# Patient Record
Sex: FEMALE | Race: WHITE | NOT HISPANIC OR LATINO | Employment: FULL TIME | URBAN - METROPOLITAN AREA
[De-identification: names, ages, dates, MRNs, and addresses within clinical notes are randomized per-mention and may not be internally consistent; named-entity substitution may affect disease eponyms.]

---

## 2017-03-06 ENCOUNTER — ALLSCRIPTS OFFICE VISIT (OUTPATIENT)
Dept: OTHER | Facility: OTHER | Age: 32
End: 2017-03-06

## 2017-03-08 LAB
HPV 18 (HISTORICAL): NOT DETECTED
HPV HIGH RISK 16/18 (HISTORICAL): NOT DETECTED
HPV16 (HISTORICAL): NOT DETECTED
PAP (HISTORICAL): NORMAL

## 2017-12-24 LAB — EXTERNAL RUBELLA IGG QUANTITATION: NORMAL
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
## 2018-01-11 NOTE — MISCELLANEOUS
Message   Date: 09 Jun 2016 3:26 PM EST, Recorded By: Britney Hoffmann For: Micheal Gallegos   Caller: Levon Felty, Other   Reason: Other   Patient is enrolled in [address] [place] [address] with [facility]  Any services that require authorization she can contacted to help getting it approved  Phone number [phone]  Ext [phone]  AP        Active Problems    1  Encounter for supervision of normal first pregnancy in third trimester (V22 0) (Z34 03)   2  Group beta Strep positive (041 02) (B95 1)   3  Maternal obesity, antepartum, second trimester (649 13,278 00) (O99 212,E66 9)   4  Obesity in pregnancy (649 10) (O99 210)   5  Placental condition affecting management of mother in second trimester (656 73)   (O43 92)    Current Meds   1  Prenatal Vitamins CAPS; Therapy: (Recorded:11Nov2015) to Recorded    Allergies    1  Ceclor CAPS    2  No Known Environmental Allergies   3  No Known Food Allergies   4  Other    Signatures   Electronically signed by :  Forrest Cruz, ; Jun 9 2016  3:29PM EST                       (Author)

## 2018-01-13 VITALS
HEIGHT: 65 IN | DIASTOLIC BLOOD PRESSURE: 72 MMHG | WEIGHT: 199 LBS | SYSTOLIC BLOOD PRESSURE: 122 MMHG | BODY MASS INDEX: 33.15 KG/M2

## 2018-01-15 NOTE — PROGRESS NOTES
MAR 4 2016         RE: Stephanie Mcguire                                To: [facility]  MR#: [id number]                                    [address]   : [address]   ENC: [id number] Cerrato, [address]   (Exam #: [id number])                           Fax: [phone]      The LMP of this 28year old,  1, para 0 patient was AUG 21 2015,   giving her an BHAVANI of MAY 27 2016 and a current gestational age of 35 weeks   0 days by dates  A sonographic examination was performed on MAR 4 2016   using real time equipment  The ultrasound examination was performed using   abdominal technique  The patient has a BMI of 33 6  Her blood pressure   today was 119/68  Earliest ultrasound found in her record: 11/11/15  11w1d  BHAVANI 16      Stephanie has no complaints today  She reports regular fetal movements and   denies problems related to hypertension, gestational diabetes,    labor, or vaginal bleeding  Cardiac motion was observed at 146 bpm       INDICATIONS      fetal growth   obesity   abnormal uterine Doppler      Exam Types      Level I      RESULTS      Fetus # 1 of 1   Vertex presentation   Fetal growth appeared normal   Placenta Location = Anterior   Placenta Grade = I      MEASUREMENTS (* Included In Average GA)      AC              23 9 cm        28 weeks 1 day * (48%)   BPD              7 0 cm        28 weeks 0 days* (39%)   HC              25 6 cm        27 weeks 4 days* (28%)   Femur            5 5 cm        28 weeks 6 days* (54%)      Cerebellum       3 2 cm        28 weeks 4 days      HC/AC           1 07   FL/AC           0 23   FL/BPD          0 78   EFW (Ac/Fl/Hc)  1210 grams - 2 lbs 11 oz                 (52%)      THE AVERAGE GESTATIONAL AGE is 28 weeks 1 day +/- 14 days        AMNIOTIC FLUID      Q1: 3 8      Q2: 3 8      Q3: 3 6      Q4: 3 9   YOVANI Total = 15 1 cm   Amniotic Fluid: Normal      IMPRESSION      Arnett IUP   28 weeks and 1 day by this ultrasound  (BHAVANI=MAY 26 2016)   Vertex presentation   Fetal growth appeared normal   Regular fetal heart rate of 146 bpm   Anterior placenta      GENERAL COMMENT      No fetal structural abnormality is identified on the Level I survey today  The fetal brain, heart including the four-chamber, septal, and 3 vessel   tracheal views, stomach, kidneys, bladder, three vessel cord, and   diaphragm appear normal   Fetal interval growth and amniotic fluid volume   are normal       Today's ultrasound findings and suggested follow-up were discussed in   detail with Stephanie  She will return to the [facility]  in the mid   third trimester to assess interval growth  Daily third trimester fetal   kick counting was discussed at the visit today  The face to face time, in addition to time spent discussing ultrasound   results, was 10 minutes, greater than 50% of which was spent during   counseling and coordination of care  Amelie Ehrich, R D M S Percell Closs, M D     Maternal-Fetal Medicine   Electronically signed 03/04/16 13:44

## 2018-01-16 NOTE — MISCELLANEOUS
Message  Pt called this am with c/o of pain in folds of groin  Pt states she thinks she pulled a muscle while moving in bed the other night  Pt is concerned with continued pain in this area  States it feels like muscular pulling  Pt made aware that round ligament pain can be very common especially in first pregnancy  Pt to use warm, moist heat, tylenol  Denies abd pain, or bleeding  States she feels baby move  Active Problems    1  Encounter for fetal anatomic survey (V28 81) (Z36)   2  Encounter for prenatal care in first trimester of first pregnancy (V22 0) (Z34 01)   3  Encounter for prenatal care of first pregnancy, unspecified trimester (V22 0) (Z34 00)   4  Encounter for screening for risk of pre-term labor (V28 82) (Z36)   5  Maternal obesity, antepartum, second trimester (649 13,278 00) (O99 212,E66 9)   6  Obesity in pregnancy (649 10) (O99 210)   7  Placental condition affecting management of mother in second trimester (656 73)   (O43 92)   8  Pregnancy, obstetrical care (V22 1) (Z34 90)    Current Meds   1  Prenatal Vitamins CAPS; Therapy: (Recorded:11Nov2015) to Recorded    Allergies    1  Ceclor CAPS    2  No Known Environmental Allergies   3  No Known Food Allergies   4   Other    Signatures   Electronically signed by : Zana Prabhakar, ; Jan 29 2016 10:59AM EST                       (Author)

## 2018-01-18 NOTE — PROGRESS NOTES
APR 15 2016         RE: Stephanie Mcguire                                To: [facility]  MR#: [id number]                                    [address]   : [address]   ENC: [id number]                             Saqib, [address]   (Exam #: [id number])                           Fax: [phone]      The LMP of this 28year old,  1, para 0 patient was AUG 21 2015,   giving her an BHAVANI of MAY 27 2016 and a current gestational age of 29 weeks   0 days by dates  A sonographic examination was performed on APR 15 2016   using real time equipment  The patient has a BMI of 35 4  Her blood   pressure today was 108/62  Earliest ultrasound found in her record: 11/11/15  11w1d  BHAAVNI 16      Cardiac motion was observed at 144 bpm       INDICATIONS      obesity   abnormal uterine Doppler   fetal anatomical survey      Exam Types      LEVEL II   Transvaginal      RESULTS      Fetus # 1 of 1   Vertex presentation   Placenta Location = Posterior   Placenta Grade = II      MEASUREMENTS (* Included In Average GA)      AC              30 9 cm        35 weeks 0 days* (65%)   BPD              8 5 cm        34 weeks 3 days* (53%)   HC              30 6 cm        33 weeks 4 days* (29%)   Femur            6 8 cm        34 weeks 4 days* (65%)      HC/AC           0 99   FL/AC           0 22   FL/BPD          0 80   EFW (Ac/Fl/Hc)  2489 grams - 5 lbs 8 oz                 (54%)      THE AVERAGE GESTATIONAL AGE is 34 weeks 3 days +/- 21 days        AMNIOTIC FLUID      Q1: 3 2      Q2: 3 0      Q3: 4 8      Q4: 2 0   YOVANI Total = 13 0 cm   Amniotic Fluid: Normal      ANATOMY      Head                                    Normal   Heart                                   See Details   Stomach                                 Normal   Right Kidney                            Normal   Left Kidney                             Normal   Bladder Normal   Placenta                                Normal      ANATOMY DETAILS      Visualized Appearing Sonographically Normal:   HEAD: (Calvarium, BPD Level, Lateral Ventricles, Choroid Plexus,   Cerebellum, Cisterna Magna); HEART: (Cardiac Axis, Cardiac Position);      STOMACH, RIGHT KIDNEY, LEFT KIDNEY, BLADDER, PLACENTA      Not Visualized:   HEART: (Four Chamber View, Proximal Left Outflow, Proximal Right Outflow,   Interventricular Septum, Interatrial Septum)      ANATOMY COMMENTS      Fetal anatomy has been previously documented; no anomalies were   identified  No fetal structural abnormality is identified on the Level I   survey today  Follow up anatomy of the lateral ventricles, diaphragm,    kidneys, stomach and bladder appear normal  Fetal interval growth and   amniotic fluid volume are normal       IMPRESSION      Arnett IUP   34 weeks and 3 days by this ultrasound  (BHAVANI=MAY 24 2016)   Vertex presentation   Regular fetal heart rate of 144 bpm   Posterior placenta      GENERAL COMMENT         I had the pleasure of seeing Evelyn Sapp  in the [facility]  today   for followup growth scan  She reports normal daily fetal movements  She   denies any vaginal bleeding, leakage of fluid, or significant contractions   or pelvic pressure  There has been no major pregnancy complications since   her last visit here in the  Center  Kimber does have a slightly   elevated body mass index of 35  Her prior ultrasound had showed an   abnormal uterine artery Doppler flow study  On today's ultrasound, the fetus was in a vertex presentation  The   amniotic fluid appeared very normal today  Fetal growth was within normal   range  Growth was at the 54th percentile today which is reassuring  The   interval anatomy seen today showed no obvious anomalies  We discussed the importance of initiating fetal kick counting at least   once daily   We discussed the "10 kicks in 2 hour rule"  I instructed her   to report to you immediately should criteria not be met  Kick counts   should begin at 28 weeks gestation  IMPORTANT  FINDINGS ON TODAY'S ULTRASOUND: None, overall very reassuring   ultrasound today in the  Center         IN SUMMARY:  Today's ultrasound was reassuring as the fetus is growing   well with normal amniotic fluid and a normal umbilical artery Doppler flow   study  The fetus was in a vertex presentation  She should continue to do   her kick counts on a daily basis  No further growth scans appear indicated   at this time  Certainly we would be happy to see her if there were any   pregnancy complications  Face-to-face time, in addition to time spent discussing ultrasound results   was 10 minutes, with greater than 50% of the time used for counseling and   coordination of care  A description of the counseling/coordination of care   is described above  Thank you very much for allowing us to participate in the care of your   patient  If you have any questions or concerns about today's visit please   do not hesitate to call me  Thank you very much  Demetris SUAREZ  Maternal Fetal Medicine      ARCELIA Brown M D     Maternal-Fetal Medicine   Electronically signed 04/15/16 15:17

## 2018-06-26 ENCOUNTER — OFFICE VISIT (OUTPATIENT)
Dept: OBGYN CLINIC | Age: 33
End: 2018-06-26
Payer: COMMERCIAL

## 2018-06-26 VITALS
SYSTOLIC BLOOD PRESSURE: 100 MMHG | DIASTOLIC BLOOD PRESSURE: 64 MMHG | BODY MASS INDEX: 34.55 KG/M2 | WEIGHT: 207.38 LBS | HEIGHT: 65 IN

## 2018-06-26 DIAGNOSIS — Z01.419 ENCOUNTER FOR GYNECOLOGICAL EXAMINATION WITHOUT ABNORMAL FINDING: Primary | ICD-10-CM

## 2018-06-26 PROCEDURE — S0612 ANNUAL GYNECOLOGICAL EXAMINA: HCPCS | Performed by: OBSTETRICS & GYNECOLOGY

## 2018-06-26 NOTE — ASSESSMENT & PLAN NOTE
Pap/HPV current due 2020  Contraception: condoms    Encourage healthy diet, exercise, Calcium 1200mg per day and at least 800 iu Vitamin D daily  If planning on conception in the next year- recommend starting prenatal vitamin
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
## 2018-06-26 NOTE — PATIENT INSTRUCTIONS
Wellness Visit for Adults   WHAT YOU NEED TO KNOW:   What is a wellness visit? A wellness visit is when you see your healthcare provider to get screened for health problems  You can also get advice on how to stay healthy  Write down your questions so you remember to ask them  Ask your healthcare provider how often you should have a wellness visit  What happens at a wellness visit? Your healthcare provider will ask about your health, and your family history of health problems  This includes high blood pressure, heart disease, and cancer  He or she will ask if you have symptoms that concern you, if you smoke, and about your mood  You may also be asked about your intake of medicines, supplements, food, and alcohol  Any of the following may be done:  · Your weight  will be checked  Your height may also be checked so your body mass index (BMI) can be calculated  Your BMI shows if you are at a healthy weight  · Your blood pressure  and heart rate will be checked  Your temperature may also be checked  · Blood and urine tests  may be done  Blood tests may be done to check your cholesterol levels  Abnormal cholesterol levels increase your risk for heart disease and stroke  You may also need a blood or urine test to check for diabetes if you are at increased risk  Urine tests may be done to look for signs of an infection or kidney disease  · A physical exam  includes checking your heartbeat and lungs with a stethoscope  Your healthcare provider may also check your skin to look for sun damage  · Screening tests  may be recommended  A screening test is done to check for diseases that may not cause symptoms  The screening tests you may need depend on your age, gender, family history, and lifestyle habits  For example, colorectal screening may be recommended if you are 48years old or older  What screening tests do I need if I am a woman? · A Pap smear  is used to screen for cervical cancer   Pap smears are usually done every 3 to 5 years depending on your age  You may need them more often if you have had abnormal Pap smear test results in the past  Ask your healthcare provider how often you should have a Pap smear  · A mammogram  is an x-ray of your breasts to screen for breast cancer  Experts recommend mammograms every 2 years starting at age 48 years  You may need a mammogram at age 52 years or younger if you have an increased risk for breast cancer  Talk to your healthcare provider about when you should start having mammograms and how often you need them  What vaccines might I need? · Get an influenza vaccine  every year  The influenza vaccine protects you from the flu  Several types of viruses cause the flu  The viruses change over time, so new vaccines are made each year  · Get a tetanus-diphtheria (Td) booster vaccine  every 10 years  This vaccine protects you against tetanus and diphtheria  Tetanus is a severe infection that may cause painful muscle spasms and lockjaw  Diphtheria is a severe bacterial infection that causes a thick covering in the back of your mouth and throat  · Get a human papillomavirus (HPV) vaccine  if you are female and aged 23 to 32 or male 23 to 24 and never received it  This vaccine protects you from HPV infection  HPV is the most common infection spread by sexual contact  HPV may also cause vaginal, penile, and anal cancers  · Get a pneumococcal vaccine  if you are aged 72 years or older  The pneumococcal vaccine is an injection given to protect you from pneumococcal disease  Pneumococcal disease is an infection caused by pneumococcal bacteria  The infection may cause pneumonia, meningitis, or an ear infection  · Get a shingles vaccine  if you are aged 61 or older, even if you have had shingles before  The shingles vaccine is an injection to protect you from the varicella-zoster virus  This is the same virus that causes chickenpox   Shingles is a painful rash that develops in people who had chickenpox or have been exposed to the virus  How can I eat healthy? My Plate is a model for planning healthy meals  It shows the types and amounts of foods that should go on your plate  Fruits and vegetables make up about half of your plate, and grains and protein make up the other half  A serving of dairy is included on the side of your plate  The amount of calories and serving sizes you need depends on your age, gender, weight, and height  Examples of healthy foods are listed below:  · Eat a variety of vegetables  such as dark green, red, and orange vegetables  You can also include canned vegetables low in sodium (salt) and frozen vegetables without added butter or sauces  · Eat a variety of fresh fruits , canned fruit in 100% juice, frozen fruit, and dried fruit  · Include whole grains  At least half of the grains you eat should be whole grains  Examples include whole-wheat bread, wheat pasta, brown rice, and whole-grain cereals such as oatmeal     · Eat a variety of protein foods such as seafood (fish and shellfish), lean meat, and poultry without skin (turkey and chicken)  Examples of lean meats include pork leg, shoulder, or tenderloin, and beef round, sirloin, tenderloin, and extra lean ground beef  Other protein foods include eggs and egg substitutes, beans, peas, soy products, nuts, and seeds  · Choose low-fat dairy products such as skim or 1% milk or low-fat yogurt, cheese, and cottage cheese  · Limit unhealthy fats  such as butter, hard margarine, and shortening  How much exercise do I need? Exercise at least 30 minutes per day on most days of the week  Some examples of exercise include walking, biking, dancing, and swimming  You can also fit in more physical activity by taking the stairs instead of the elevator or parking farther away from stores  Include muscle strengthening activities 2 days each week  Regular exercise provides many health benefits  It helps you manage your weight, and decreases your risk for type 2 diabetes, heart disease, stroke, and high blood pressure  Exercise can also help improve your mood  Ask your healthcare provider about the best exercise plan for you  What are some general health and safety guidelines I should follow? · Do not smoke  Nicotine and other chemicals in cigarettes and cigars can cause lung damage  Ask your healthcare provider for information if you currently smoke and need help to quit  E-cigarettes or smokeless tobacco still contain nicotine  Talk to your healthcare provider before you use these products  · Limit alcohol  A drink of alcohol is 12 ounces of beer, 5 ounces of wine, or 1½ ounces of liquor  · Lose weight, if needed  Being overweight increases your risk of certain health conditions  These include heart disease, high blood pressure, type 2 diabetes, and certain types of cancer  · Protect your skin  Do not sunbathe or use tanning beds  Use sunscreen with a SPF 15 or higher  Apply sunscreen at least 15 minutes before you go outside  Reapply sunscreen every 2 hours  Wear protective clothing, hats, and sunglasses when you are outside  · Drive safely  Always wear your seatbelt  Make sure everyone in your car wears a seatbelt  A seatbelt can save your life if you are in an accident  Do not use your cell phone when you are driving  This could distract you and cause an accident  Pull over if you need to make a call or send a text message  · Practice safe sex  Use latex condoms if are sexually active and have more than one partner  Your healthcare provider may recommend screening tests for sexually transmitted infections (STIs)  · Wear helmets, lifejackets, and protective gear  Always wear a helmet when you ride a bike or motorcycle, go skiing, or play sports that could cause a head injury  Wear protective equipment when you play sports   Wear a lifejacket when you are on a boat or doing water sports  CARE AGREEMENT:   You have the right to help plan your care  Learn about your health condition and how it may be treated  Discuss treatment options with your caregivers to decide what care you want to receive  You always have the right to refuse treatment  The above information is an  only  It is not intended as medical advice for individual conditions or treatments  Talk to your doctor, nurse or pharmacist before following any medical regimen to see if it is safe and effective for you  © 2017 [employer] Information is for End User's use only and may not be sold, redistributed or otherwise used for commercial purposes  All illustrations and images included in CareNotes® are the copyrighted property of A D A M [employer] Inc  or [employer]

## 2018-10-31 ENCOUNTER — OFFICE VISIT (OUTPATIENT)
Dept: OBGYN CLINIC | Facility: CLINIC | Age: 33
End: 2018-10-31
Payer: COMMERCIAL

## 2018-10-31 VITALS — SYSTOLIC BLOOD PRESSURE: 110 MMHG | DIASTOLIC BLOOD PRESSURE: 56 MMHG | BODY MASS INDEX: 33.38 KG/M2 | WEIGHT: 200.6 LBS

## 2018-10-31 DIAGNOSIS — N91.2 AMENORRHEA: Primary | ICD-10-CM

## 2018-10-31 PROBLEM — Z01.419 ENCOUNTER FOR GYNECOLOGICAL EXAMINATION WITHOUT ABNORMAL FINDING: Status: RESOLVED | Noted: 2018-06-26 | Resolved: 2018-10-31

## 2018-10-31 PROCEDURE — 76801 OB US < 14 WKS SINGLE FETUS: CPT | Performed by: PHYSICIAN ASSISTANT

## 2018-10-31 PROCEDURE — 99213 OFFICE O/P EST LOW 20 MIN: CPT | Performed by: PHYSICIAN ASSISTANT

## 2018-10-31 RX ORDER — PNV 119/IRON FUM/FOLIC ACID 29 MG-1 MG
1 TABLET ORAL DAILY
COMMUNITY

## 2018-10-31 RX ORDER — MAGNESIUM 30 MG
250 TABLET ORAL DAILY
COMMUNITY
End: 2019-05-31 | Stop reason: ALTCHOICE

## 2018-10-31 NOTE — ASSESSMENT & PLAN NOTE
(+) viable IUP, size consistent with dates  BHAVANI 6/3/19 by LMP  Pt congratulated and questions  RTO for interview and then PN1

## 2018-10-31 NOTE — PROGRESS NOTES
Early OB Ultrasound Procedure Note  Stephanie Mcguire    HPI  HPI    Referring Physician: Marsha Castanon MD     Technician: Study performed by the interpreting physician assistant    Indications:  amenorrhea   /56   Wt 91 kg (200 lb 9 6 oz)   LMP 2018   BMI 33 38 kg/m²     Patient's last menstrual period was 2018  with EGA of  9 weeks and 2 days        Procedure Details  A gestational sac is seen containing a yolk sac and a marcano embryo  The embryonic crown-rump length measures 2 24 cm  and calculates to an estimated gestational age of 10 weeks and 0   Days    Embryonic cardiac activity is present @ 176 BPM  Description of fetal anatomy Normal  Description of ovaries: normal  Description of uterus: normal    Findings:  Viable, marcano intrauterine pregnancy at 9 weeks,  2 days, with a calculated EDC of Mary 3, 2019 by LMP

## 2018-11-09 ENCOUNTER — TELEPHONE (OUTPATIENT)
Dept: OBGYN CLINIC | Facility: MEDICAL CENTER | Age: 33
End: 2018-11-09

## 2018-11-09 ENCOUNTER — TELEPHONE (OUTPATIENT)
Dept: OBGYN CLINIC | Facility: CLINIC | Age: 33
End: 2018-11-09

## 2018-11-09 NOTE — TELEPHONE ENCOUNTER
Patient left voicemail - she is about 10 weeks pregnant  - wants to know if she can have a TB test done  Please call her back

## 2018-11-09 NOTE — TELEPHONE ENCOUNTER
Pt is 10 weeks and needs a TB test for a job, she was wondering if she could have that done while pregnant? Please advise

## 2018-11-09 NOTE — TELEPHONE ENCOUNTER
Spoke with Pt today via phone call  Pt informed that it is safe to receive a TB test during pregnancy per RN Win Gao  Reiterated to Pt that if she has any questions/concerns to contact office

## 2018-11-15 ENCOUNTER — TELEPHONE (OUTPATIENT)
Dept: OBGYN CLINIC | Facility: CLINIC | Age: 33
End: 2018-11-15

## 2018-11-15 NOTE — TELEPHONE ENCOUNTER
Called pt to do phone interview earlier than her scheduled time d/t office closing early; LM for pt to call back to reschedule

## 2018-11-21 ENCOUNTER — TELEPHONE (OUTPATIENT)
Dept: OBGYN CLINIC | Age: 33
End: 2018-11-21

## 2018-12-04 ENCOUNTER — TELEPHONE (OUTPATIENT)
Dept: OBGYN CLINIC | Facility: CLINIC | Age: 33
End: 2018-12-04

## 2018-12-04 DIAGNOSIS — Z34.81 PRENATAL CARE, SUBSEQUENT PREGNANCY, FIRST TRIMESTER: Primary | ICD-10-CM

## 2018-12-04 NOTE — TELEPHONE ENCOUNTER
Patient would like to have her 20 week gender reveal ultrasound but the [facility] office told the patient that she needs a referral from out office to have it done

## 2018-12-06 ENCOUNTER — INITIAL PRENATAL (OUTPATIENT)
Dept: OBGYN CLINIC | Facility: CLINIC | Age: 33
End: 2018-12-06

## 2018-12-06 VITALS — BODY MASS INDEX: 33.38 KG/M2 | HEIGHT: 65 IN

## 2018-12-06 DIAGNOSIS — Z34.82 PRENATAL CARE, SUBSEQUENT PREGNANCY, SECOND TRIMESTER: Primary | ICD-10-CM

## 2018-12-06 PROCEDURE — OBC: Performed by: PHYSICIAN ASSISTANT

## 2018-12-06 NOTE — PROGRESS NOTES
OB INTAKE INTERVIEW  * Pt presents for OB intake  * Accompanied by: phone intake   *  *Hx of  delivery prior to 36 weeks 6 days NO  *Last Menstrual Period: Pt's LMP was 18  *Ultrasound date:10/31/18   9weeks 0days  *Estimated date of delivery: Mary 3, 2019   * confirmed by LMP    *Signs/Symptoms of Pregnancy   *nausea   *constipation NO   *headaches NO   *cramping/spotting NO   *PICA cravings NO  *Diabetes- if you answer yes, please order 1 hour GTT, 50g   *hx of GDM NO   *BMI >35 NO   *first degree relative with type 2 diabetes YES   *hx of PCOS NO   *current metformin use NO   *prior hx of LGA/macrosomia NO   *AMA with other risk factors NO  *Hypertension- if you answer yes, please order preeclampsia labs including 24 hour urine protein   *Hx of chronic HTN NO   *hx of gestational HTN NO   *hx of preeclampsia, eclampsia, or HELLP syndrome NO  *Immunizations:   *influenza vaccine given today NO-phone intake, needs at PN1   *discussed Tdap vaccine    *Interview education   *ANTERIOS Pregnancy Essentials Book reviewed and discussed   *Handouts given:    *Baby and Me phone emily guide    *Baby and Me support center    *[email or web address]     *discussed genetic testing- pt interested NO     *appointment at [place] [id number] made YES-for level II    *Prenatal lab work scripts mailed  *I have these concerns about this prenatal patient: none  *Details that I feel the provider should be aware of: Stephanie delivered her first child Almon Cassette with Dr Francheska Daily, she is familiar with our practice  She will be going to [facility] for her labs  She works as a teacher  PN1 visit scheduled  The patient was oriented to our practice and all questions were answered      Interviewed by: Lory Kinney RN

## 2018-12-18 ENCOUNTER — INITIAL PRENATAL (OUTPATIENT)
Dept: OBGYN CLINIC | Facility: MEDICAL CENTER | Age: 33
End: 2018-12-18
Payer: COMMERCIAL

## 2018-12-18 VITALS — WEIGHT: 202 LBS | DIASTOLIC BLOOD PRESSURE: 70 MMHG | BODY MASS INDEX: 33.61 KG/M2 | SYSTOLIC BLOOD PRESSURE: 128 MMHG

## 2018-12-18 DIAGNOSIS — Z3A.16 16 WEEKS GESTATION OF PREGNANCY: Primary | ICD-10-CM

## 2018-12-18 DIAGNOSIS — Z23 FLU VACCINE NEED: ICD-10-CM

## 2018-12-18 PROBLEM — O99.210 OBESITY AFFECTING PREGNANCY: Status: ACTIVE | Noted: 2018-12-18

## 2018-12-18 PROBLEM — N91.2 AMENORRHEA: Status: RESOLVED | Noted: 2018-10-31 | Resolved: 2018-12-18

## 2018-12-18 PROCEDURE — 90686 IIV4 VACC NO PRSV 0.5 ML IM: CPT | Performed by: PHYSICIAN ASSISTANT

## 2018-12-18 PROCEDURE — PNV: Performed by: PHYSICIAN ASSISTANT

## 2018-12-18 PROCEDURE — 87491 CHLMYD TRACH DNA AMP PROBE: CPT | Performed by: PHYSICIAN ASSISTANT

## 2018-12-18 PROCEDURE — 90471 IMMUNIZATION ADMIN: CPT | Performed by: PHYSICIAN ASSISTANT

## 2018-12-18 PROCEDURE — 87591 N.GONORRHOEAE DNA AMP PROB: CPT | Performed by: PHYSICIAN ASSISTANT

## 2018-12-18 NOTE — PROGRESS NOTES
16 weeks gestation of pregnancy  Feels well   No VB, LOF, cramping/pelvic pain  PNL not yet completed, plans to do so next week  PAP up to date  GC/CT cx today  Flu vacc given today  Imaging sched w/ MFM    Previous vaginal delivery in 2016 at 40w4d    Reviewed reasons to call    Obesity affecting pregnancy  Early 1 hr GTT ordered

## 2018-12-18 NOTE — ASSESSMENT & PLAN NOTE
Feels well   No VB, LOF, cramping/pelvic pain  PNL not yet completed, plans to do so next week  PAP up to date  GC/CT cx today  Flu vacc given today  Imaging sched w/ MFM    Previous vaginal delivery in 2016 at 40w4d    Reviewed reasons to call

## 2018-12-20 LAB
C TRACH DNA SPEC QL NAA+PROBE: NEGATIVE
N GONORRHOEA DNA SPEC QL NAA+PROBE: NEGATIVE

## 2018-12-21 ENCOUNTER — TELEPHONE (OUTPATIENT)
Dept: OBGYN CLINIC | Facility: CLINIC | Age: 33
End: 2018-12-21

## 2018-12-26 LAB
APPEARANCE UR: CLEAR
BACTERIA UR QL AUTO: NORMAL /HPF
BASOPHILS # BLD AUTO: 31 CELLS/UL (ref 0–200)
BASOPHILS NFR BLD AUTO: 0.4 %
BILIRUB UR QL STRIP: NEGATIVE
COLOR UR: YELLOW
EOSINOPHIL # BLD AUTO: 23 CELLS/UL (ref 15–500)
EOSINOPHIL NFR BLD AUTO: 0.3 %
ERYTHROCYTE [DISTWIDTH] IN BLOOD BY AUTOMATED COUNT: 13.1 % (ref 11–15)
GLUCOSE 1H P 50 G GLC PO SERPL-MCNC: 75 MG/DL
GLUCOSE UR QL STRIP: NEGATIVE
HBV SURFACE AG SERPL QL IA: NORMAL
HCT VFR BLD AUTO: 36.5 % (ref 35–45)
HGB BLD-MCNC: 12.5 G/DL (ref 11.7–15.5)
HGB UR QL STRIP: NEGATIVE
HYALINE CASTS #/AREA URNS LPF: NORMAL /LPF
KETONES UR QL STRIP: NEGATIVE
LEUKOCYTE ESTERASE UR QL STRIP: NEGATIVE
LYMPHOCYTES # BLD AUTO: 1654 CELLS/UL (ref 850–3900)
LYMPHOCYTES NFR BLD AUTO: 21.2 %
MCH RBC QN AUTO: 30 PG (ref 27–33)
MCHC RBC AUTO-ENTMCNC: 34.2 G/DL (ref 32–36)
MCV RBC AUTO: 87.5 FL (ref 80–100)
MONOCYTES # BLD AUTO: 413 CELLS/UL (ref 200–950)
MONOCYTES NFR BLD AUTO: 5.3 %
NEUTROPHILS # BLD AUTO: 5678 CELLS/UL (ref 1500–7800)
NEUTROPHILS NFR BLD AUTO: 72.8 %
NITRITE UR QL STRIP: NEGATIVE
PH UR STRIP: 7 [PH] (ref 5–8)
PLATELET # BLD AUTO: 246 THOUSAND/UL (ref 140–400)
PMV BLD REES-ECKER: 9.5 FL (ref 7.5–12.5)
PROT UR QL STRIP: NEGATIVE
RBC # BLD AUTO: 4.17 MILLION/UL (ref 3.8–5.1)
RBC #/AREA URNS HPF: NORMAL /HPF
RPR SER QL: NORMAL
RUBV IGG SERPL IA-ACNC: 1.51 INDEX
SP GR UR STRIP: 1.02 (ref 1–1.03)
SQUAMOUS #/AREA URNS HPF: NORMAL /HPF
WBC # BLD AUTO: 7.8 THOUSAND/UL (ref 3.8–10.8)
WBC #/AREA URNS HPF: NORMAL /HPF

## 2019-01-15 ENCOUNTER — ROUTINE PRENATAL (OUTPATIENT)
Dept: OBGYN CLINIC | Age: 34
End: 2019-01-15

## 2019-01-15 VITALS — BODY MASS INDEX: 34.11 KG/M2 | DIASTOLIC BLOOD PRESSURE: 62 MMHG | WEIGHT: 205 LBS | SYSTOLIC BLOOD PRESSURE: 112 MMHG

## 2019-01-15 DIAGNOSIS — Z3A.20 20 WEEKS GESTATION OF PREGNANCY: ICD-10-CM

## 2019-01-15 DIAGNOSIS — O99.212 OBESITY AFFECTING PREGNANCY IN SECOND TRIMESTER: ICD-10-CM

## 2019-01-15 DIAGNOSIS — Z34.82 ENCOUNTER FOR SUPERVISION OF OTHER NORMAL PREGNANCY, SECOND TRIMESTER: Primary | ICD-10-CM

## 2019-01-15 PROCEDURE — PNV: Performed by: NURSE PRACTITIONER

## 2019-01-15 NOTE — PATIENT INSTRUCTIONS
Pregnancy at 23 to 22 Weeks   AMBULATORY CARE:   What changes are happening to your body:  Now that you are in your second trimester, you have more energy  You may also be feeling hungrier than usual  You may be gaining about ½ to 1 pound a week, and your pregnancy is beginning to show  You may need to start wearing maternity clothes  As your baby gets larger, you may have other symptoms  These may include body aches or stretch marks on your abdomen, breasts, thighs, or buttocks  Seek care immediately if:   · You develop a severe headache that does not go away  · You have new or increased vision changes, such as blurred or spotted vision  · You have new or increased swelling in your face or hands  · You have vaginal spotting or bleeding  · Your water broke or you feel warm water gushing or trickling from your vagina  Contact your healthcare provider if:   · You have abdominal cramps, pressure, or tightening  · You have a change in vaginal discharge  · You cannot keep food or drinks down, and you are losing weight  · You have chills or a fever  · You have vaginal itching, burning, or pain  · You have yellow, green, white, or foul-smelling vaginal discharge  · You have pain or burning when you urinate, less urine than usual, or pink or bloody urine  · You have questions or concerns about your condition or care  How to care for yourself at this stage of your pregnancy:   · Eat a variety of healthy foods  Healthy foods include fruits, vegetables, whole-grain breads, low-fat dairy foods, beans, lean meats, and fish  Drink liquids as directed  Ask how much liquid to drink each day and which liquids are best for you  Limit caffeine to less than 200 milligrams each day  Limit your intake of fish to 2 servings each week  Choose fish low in mercury such as canned light tuna, shrimp, salmon, cod, or tilapia   Do not  eat fish high in mercury such as swordfish, tilefish, adam mackerel, and shark      · Take prenatal vitamins as directed  Your need for certain vitamins and minerals, such as folic acid, increases during pregnancy  Prenatal vitamins provide some of the extra vitamins and minerals you need  Prenatal vitamins may also help to decrease the risk of certain birth defects  · Talk to your healthcare provider about exercise  Moderate exercise can help you stay fit  Your healthcare provider will help you plan an exercise program that is safe for you during pregnancy  · Do not smoke  If you smoke, it is never too late to quit  Smoking increases your risk of a miscarriage and other health problems during your pregnancy  Smoking can cause your baby to be born too early or weigh less at birth  Ask your healthcare provider for information if you need help quitting  · Do not drink alcohol  Alcohol passes from your body to your baby through the placenta  It can affect your baby's brain development and cause fetal alcohol syndrome (FAS)  FAS is a group of conditions that causes mental, behavior, and growth problems  · Talk to your healthcare provider before you take any medicines  Many medicines may harm your baby if you take them when you are pregnant  Do not take any medicines, vitamins, herbs, or supplements without first talking to your healthcare provider  Never use illegal or street drugs (such as marijuana or cocaine) while you are pregnant  Safety tips during pregnancy:   · Avoid hot tubs and saunas  Do not use a hot tub or sauna while you are pregnant, especially during your first trimester  Hot tubs and saunas may raise your baby's temperature and increase the risk of birth defects  · Avoid toxoplasmosis  This is an infection caused by eating raw meat or being around infected cat feces  It can cause birth defects, miscarriages, and other problems  Wash your hands after you touch raw meat  Make sure any meat is well-cooked before you eat it   Avoid raw eggs and unpasteurized milk  Use gloves or ask someone else to clean your cat's litter box while you are pregnant  Changes that are happening with your baby:  By 22 weeks, your baby is about 8 inches long from the top of the head to the rump (baby's bottom)  Your baby also weighs about 1 pound  Your baby is becoming much more active  You may be able to feel the baby move inside you now  The first movements may not be that noticeable  They may feel like a fluttering sensation  As time goes on, your baby's movements will become stronger and more noticeable  What you need to know about prenatal care:  During the first 28 weeks of your pregnancy, you will see your healthcare provider once a month  Your healthcare provider will check your blood pressure and weight  You may also need the following:  · A urine test  may also be done to check for sugar and protein  These can be signs of gestational diabetes or infection  Protein in your urine may also be a sign of preeclampsia  Preeclampsia is a condition that can develop during week 20 or later of your pregnancy  It causes high blood pressure, and it can cause problems with your kidneys and other organs  · Fundal height  is a measurement of your uterus to check your baby's growth  This number is usually the same as the number of weeks that you have been pregnant  · A fetal ultrasound  shows pictures of your baby inside your uterus  It shows your baby's development  The movement and position of your baby can also be seen  Your healthcare provider may be able to tell you what your baby's gender is during the ultrasound  · Your baby's heart rate  will be checked  © 2017 [address] [employer] Information is for End User's use only and may not be sold, redistributed or otherwise used for commercial purposes  All illustrations and images included in CareNotes® are the copyrighted property of [employer] M , Inc  or [employer]    The above information is an  only  It is not intended as medical advice for individual conditions or treatments  Talk to your doctor, nurse or pharmacist before following any medical regimen to see if it is safe and effective for you

## 2019-01-15 NOTE — PROGRESS NOTES
Problem List Items Addressed This Visit     20 weeks gestation of pregnancy    Obesity affecting pregnancy    Encounter for supervision of other normal pregnancy, second trimester - Primary        Feels well  Here with her daughter, Ilya Cassette  Denies LOF/CTX/VB  No concerns  Given slip for T+S and HIV bc not done with ante labs  Early 1* was 75  L2 appt tomorrow  Flu received

## 2019-01-16 ENCOUNTER — ROUTINE PRENATAL (OUTPATIENT)
Dept: PERINATAL CARE | Facility: CLINIC | Age: 34
End: 2019-01-16
Payer: COMMERCIAL

## 2019-01-16 VITALS
BODY MASS INDEX: 34.16 KG/M2 | WEIGHT: 205 LBS | DIASTOLIC BLOOD PRESSURE: 63 MMHG | SYSTOLIC BLOOD PRESSURE: 120 MMHG | HEART RATE: 67 BPM | HEIGHT: 65 IN

## 2019-01-16 DIAGNOSIS — Z3A.20 20 WEEKS GESTATION OF PREGNANCY: ICD-10-CM

## 2019-01-16 DIAGNOSIS — O99.212 OBESITY AFFECTING PREGNANCY IN SECOND TRIMESTER: Primary | ICD-10-CM

## 2019-01-16 DIAGNOSIS — Z36.86 ENCOUNTER FOR ANTENATAL SCREENING FOR CERVICAL LENGTH: ICD-10-CM

## 2019-01-16 DIAGNOSIS — Z34.81 PRENATAL CARE, SUBSEQUENT PREGNANCY, FIRST TRIMESTER: ICD-10-CM

## 2019-01-16 PROCEDURE — 76811 OB US DETAILED SNGL FETUS: CPT | Performed by: OBSTETRICS & GYNECOLOGY

## 2019-01-16 PROCEDURE — 76817 TRANSVAGINAL US OBSTETRIC: CPT | Performed by: OBSTETRICS & GYNECOLOGY

## 2019-01-16 NOTE — PROGRESS NOTES
A transvaginal ultrasound was performed  Sonographer note on use of High Level Disinfection Process (Trophon) for transvaginal probe# 3 used, serial [id number]    [address]

## 2019-02-05 ENCOUNTER — TELEPHONE (OUTPATIENT)
Dept: OBGYN CLINIC | Facility: CLINIC | Age: 34
End: 2019-02-05

## 2019-02-05 NOTE — TELEPHONE ENCOUNTER
----- Message from [facility] sent at 2/5/2019 10:42 AM EST -----  Regarding: Non-Urgent Medical Question  Contact: [phone]  Good Morning,      I am requesting a letter for permission to get an ultrasound from [facility] with their program  I must have a letter stating what week I am at in my pregnancy and that I have already received my level 2 ultrasound with Marylin Roach  I believe it needs my name and due date as well  I appreciate your assistance in this and would need the letter/permission by next Wednesday February 13, 2019  Thank you so much!     Stephanie Mcguire

## 2019-02-05 NOTE — TELEPHONE ENCOUNTER
----- Message from [facility] sent at 2/5/2019 11:07 AM EST -----  Regarding: RE:letter  Contact: [phone]  Thank you so much! That was super quick! :)  It was mailed to me personally correct? Not NCC? Thank you again! Stephanie       ----- Message -----  From: Nurse Zeferino Butts  Sent: 2/5/2019 11:04 AM EST  To: Gayathri Mcguire  Subject: letter  Michelle Brown! I typed up your letter and mailed it out this morning  Have a great day!   Arpita Ham RN

## 2019-03-11 ENCOUNTER — ROUTINE PRENATAL (OUTPATIENT)
Dept: OBGYN CLINIC | Facility: MEDICAL CENTER | Age: 34
End: 2019-03-11

## 2019-03-11 VITALS — BODY MASS INDEX: 35.94 KG/M2 | DIASTOLIC BLOOD PRESSURE: 70 MMHG | SYSTOLIC BLOOD PRESSURE: 104 MMHG | WEIGHT: 216 LBS

## 2019-03-11 DIAGNOSIS — Z34.82 ENCOUNTER FOR SUPERVISION OF OTHER NORMAL PREGNANCY, SECOND TRIMESTER: Primary | ICD-10-CM

## 2019-03-11 DIAGNOSIS — Z34.92 PRENATAL CARE IN SECOND TRIMESTER: ICD-10-CM

## 2019-03-11 PROCEDURE — PNV: Performed by: OBSTETRICS & GYNECOLOGY

## 2019-03-11 NOTE — PROGRESS NOTES
28 week labs given to patient  Previous appt r/s due to weather conditions  Aware she can go now for testing  O positive from 2016  Slip given for HIV, type & screen, and antibody screen  Not drawn with first prenatal labs

## 2019-03-11 NOTE — PROGRESS NOTES
Needs 28 week labs, T&S and HIV as missed with PN panel  Will have 32 week growth scan and missed anatomy follow up  Feels well  Baby is very active

## 2019-03-21 ENCOUNTER — TELEPHONE (OUTPATIENT)
Dept: OBGYN CLINIC | Facility: CLINIC | Age: 34
End: 2019-03-21

## 2019-03-21 DIAGNOSIS — R73.09 ELEVATED GLUCOSE LEVEL: Primary | ICD-10-CM

## 2019-03-21 LAB
ABO GROUP BLD: NORMAL
BASOPHILS # BLD AUTO: 27 CELLS/UL (ref 0–200)
BASOPHILS NFR BLD AUTO: 0.3 %
BLD GP AB SCN SERPL QL: NORMAL
EOSINOPHIL # BLD AUTO: 27 CELLS/UL (ref 15–500)
EOSINOPHIL NFR BLD AUTO: 0.3 %
ERYTHROCYTE [DISTWIDTH] IN BLOOD BY AUTOMATED COUNT: 13.1 % (ref 11–15)
GLUCOSE 1H P 50 G GLC PO SERPL-MCNC: 142 MG/DL
HCT VFR BLD AUTO: 35.7 % (ref 35–45)
HGB BLD-MCNC: 11.8 G/DL (ref 11.7–15.5)
HIV 1+2 AB+HIV1 P24 AG SERPL QL IA: NORMAL
LYMPHOCYTES # BLD AUTO: 1273 CELLS/UL (ref 850–3900)
LYMPHOCYTES NFR BLD AUTO: 14.3 %
MCH RBC QN AUTO: 29.5 PG (ref 27–33)
MCHC RBC AUTO-ENTMCNC: 33.1 G/DL (ref 32–36)
MCV RBC AUTO: 89.3 FL (ref 80–100)
MONOCYTES # BLD AUTO: 365 CELLS/UL (ref 200–950)
MONOCYTES NFR BLD AUTO: 4.1 %
NEUTROPHILS # BLD AUTO: 7209 CELLS/UL (ref 1500–7800)
NEUTROPHILS NFR BLD AUTO: 81 %
PLATELET # BLD AUTO: 260 THOUSAND/UL (ref 140–400)
PMV BLD REES-ECKER: 9.2 FL (ref 7.5–12.5)
RBC # BLD AUTO: 4 MILLION/UL (ref 3.8–5.1)
RH BLD: NORMAL
RPR SER QL: NORMAL
WBC # BLD AUTO: 8.9 THOUSAND/UL (ref 3.8–10.8)

## 2019-03-21 NOTE — TELEPHONE ENCOUNTER
Spoke with pt - she is aware of her CBC results and that she has mild anemia - explained she needs to take iron supplements daily with her PNV  Arsalan Bautista Advised to not take at same time as PNV and to take with Vit C for better absorption  Also explained that her 1 hr GTT was elevated so she needs to do a 3 hr GTT  Arsalan Bautista Advised it was fasting and done at 3 locations  Radha Schafer,, [address]  Order in pt chart

## 2019-03-21 NOTE — TELEPHONE ENCOUNTER
Regarding: Test Results Question  Contact: [phone]  ----- Message from [address] [place] [address] 951, Generic sent at 3/21/2019 10:50 AM EDT -----    Hello,     I saw that I got my test result for my glucose yesterday but cannot actually see those results   Are the results okay or do I need to go back for the 3 hour test?    Thank you,  Stephanie

## 2019-03-21 NOTE — TELEPHONE ENCOUNTER
----- Message from Jesika Luu MD sent at 3/21/2019 12:04 PM EDT -----  Elevated 1 hour glucola, needs 3 hour GTT  Thanks!

## 2019-03-21 NOTE — TELEPHONE ENCOUNTER
Patient left voicemail - she is aware of that she is supposed to have 3 hr GTT - but was inquiring why  She wanted a breakdown how the glucose readings work and what would happen if she failed her 3 hr GTT  Explained to her the reason for the test is to determine if she has gestational diabetes  If she fails the 3 hr GTT she will referred to [facility] and will have consult with them  They would then monitor her sugar the rest of her pregnancy  Patient understands

## 2019-03-21 NOTE — TELEPHONE ENCOUNTER
Regarding: Test Results Question  Contact: [phone]  ----- Message from [address] [place] [address] 951, Generic sent at 3/21/2019 10:50 AM EDT -----    Hello,     I saw that I got my test result for my glucose yesterday but cannot actually see those results   Are the results okay or do I need to go back for the 3 hour test?    Thank you,  Stephanie

## 2019-03-26 ENCOUNTER — ROUTINE PRENATAL (OUTPATIENT)
Dept: OBGYN CLINIC | Facility: MEDICAL CENTER | Age: 34
End: 2019-03-26
Payer: COMMERCIAL

## 2019-03-26 VITALS
SYSTOLIC BLOOD PRESSURE: 110 MMHG | WEIGHT: 220.4 LBS | RESPIRATION RATE: 14 BRPM | HEIGHT: 65 IN | BODY MASS INDEX: 36.72 KG/M2 | DIASTOLIC BLOOD PRESSURE: 70 MMHG

## 2019-03-26 DIAGNOSIS — Z34.82 ENCOUNTER FOR SUPERVISION OF OTHER NORMAL PREGNANCY, SECOND TRIMESTER: ICD-10-CM

## 2019-03-26 DIAGNOSIS — Z23 NEED FOR TDAP VACCINATION: Primary | ICD-10-CM

## 2019-03-26 PROCEDURE — 90471 IMMUNIZATION ADMIN: CPT

## 2019-03-26 PROCEDURE — 90715 TDAP VACCINE 7 YRS/> IM: CPT

## 2019-03-26 PROCEDURE — PNV: Performed by: PHYSICIAN ASSISTANT

## 2019-03-26 NOTE — PROGRESS NOTES
Patient received her T-Dap vaccine today 03/26/2019 on her left deltoid  With no difficulties     Lot number [id number]  Expires 03/14/2021    [id number]

## 2019-03-26 NOTE — PROGRESS NOTES
Patient w/o complaints, was having vaginal itching but after monistat improved, some itching at night but after showering at night it has resolved, will call back if symptoms return  Denies any discharge, odor or irritation  (+) good fetal movement, denies any bleeding, fluid leakage or ctx    Problem List Items Addressed This Visit        Other    Encounter for supervision of other normal pregnancy, second trimester     RTO 2 wks  For 3hr GTT this weekend  tdap given today  Reviewed PTL precautions, fetal kick counts and reasons to call           Other Visit Diagnoses     Need for Tdap vaccination    -  Primary    Relevant Orders    TDAP VACCINE GREATER THAN OR EQUAL TO 8YO IM (Completed)

## 2019-03-26 NOTE — ASSESSMENT & PLAN NOTE
RTO 2 wks  For 3hr GTT this weekend  tdap given today  Reviewed PTL precautions, fetal kick counts and reasons to call

## 2019-03-30 ENCOUNTER — APPOINTMENT (OUTPATIENT)
Dept: LAB | Facility: HOSPITAL | Age: 34
End: 2019-03-30
Attending: OBSTETRICS & GYNECOLOGY
Payer: COMMERCIAL

## 2019-03-30 DIAGNOSIS — R73.09 ELEVATED GLUCOSE LEVEL: ICD-10-CM

## 2019-03-30 LAB
GLUCOSE 1H P 100 G GLC PO SERPL-MCNC: 150 MG/DL (ref 65–179)
GLUCOSE 2H P 100 G GLC PO SERPL-MCNC: 123 MG/DL (ref 65–154)
GLUCOSE 3H P 100 G GLC PO SERPL-MCNC: 48 MG/DL (ref 65–139)
GLUCOSE P FAST SERPL-MCNC: 81 MG/DL (ref 65–99)

## 2019-03-30 PROCEDURE — 36415 COLL VENOUS BLD VENIPUNCTURE: CPT

## 2019-03-30 PROCEDURE — 82951 GLUCOSE TOLERANCE TEST (GTT): CPT

## 2019-03-30 PROCEDURE — 82952 GTT-ADDED SAMPLES: CPT

## 2019-04-01 ENCOUNTER — TELEPHONE (OUTPATIENT)
Dept: OBGYN CLINIC | Facility: CLINIC | Age: 34
End: 2019-04-01

## 2019-04-08 ENCOUNTER — ROUTINE PRENATAL (OUTPATIENT)
Dept: OBGYN CLINIC | Facility: CLINIC | Age: 34
End: 2019-04-08

## 2019-04-08 VITALS — WEIGHT: 221.13 LBS | SYSTOLIC BLOOD PRESSURE: 118 MMHG | BODY MASS INDEX: 36.8 KG/M2 | DIASTOLIC BLOOD PRESSURE: 86 MMHG

## 2019-04-08 DIAGNOSIS — Z34.83 ENCOUNTER FOR SUPERVISION OF OTHER NORMAL PREGNANCY, THIRD TRIMESTER: Primary | ICD-10-CM

## 2019-04-08 PROBLEM — Z3A.32 32 WEEKS GESTATION OF PREGNANCY: Status: ACTIVE | Noted: 2018-12-18

## 2019-04-08 PROCEDURE — PNV: Performed by: OBSTETRICS & GYNECOLOGY

## 2019-04-11 ENCOUNTER — ULTRASOUND (OUTPATIENT)
Dept: PERINATAL CARE | Facility: CLINIC | Age: 34
End: 2019-04-11
Payer: COMMERCIAL

## 2019-04-11 VITALS
WEIGHT: 223.8 LBS | DIASTOLIC BLOOD PRESSURE: 70 MMHG | BODY MASS INDEX: 37.29 KG/M2 | HEIGHT: 65 IN | HEART RATE: 73 BPM | RESPIRATION RATE: 18 BRPM | SYSTOLIC BLOOD PRESSURE: 110 MMHG

## 2019-04-11 DIAGNOSIS — Z3A.32 32 WEEKS GESTATION OF PREGNANCY: ICD-10-CM

## 2019-04-11 DIAGNOSIS — O99.213 OBESITY AFFECTING PREGNANCY IN THIRD TRIMESTER: Primary | ICD-10-CM

## 2019-04-11 PROCEDURE — 99212 OFFICE O/P EST SF 10 MIN: CPT | Performed by: OBSTETRICS & GYNECOLOGY

## 2019-04-11 PROCEDURE — 76816 OB US FOLLOW-UP PER FETUS: CPT | Performed by: OBSTETRICS & GYNECOLOGY

## 2019-04-22 ENCOUNTER — ROUTINE PRENATAL (OUTPATIENT)
Dept: OBGYN CLINIC | Facility: CLINIC | Age: 34
End: 2019-04-22

## 2019-04-22 VITALS — BODY MASS INDEX: 37.8 KG/M2 | DIASTOLIC BLOOD PRESSURE: 82 MMHG | SYSTOLIC BLOOD PRESSURE: 102 MMHG | WEIGHT: 227.13 LBS

## 2019-04-22 DIAGNOSIS — Z34.83 ENCOUNTER FOR SUPERVISION OF OTHER NORMAL PREGNANCY, THIRD TRIMESTER: Primary | ICD-10-CM

## 2019-04-22 PROBLEM — Z3A.34 34 WEEKS GESTATION OF PREGNANCY: Status: ACTIVE | Noted: 2018-12-18

## 2019-04-22 PROCEDURE — PNV: Performed by: OBSTETRICS & GYNECOLOGY

## 2019-04-25 ENCOUNTER — TELEPHONE (OUTPATIENT)
Dept: OBGYN CLINIC | Facility: CLINIC | Age: 34
End: 2019-04-25

## 2019-05-09 ENCOUNTER — TELEPHONE (OUTPATIENT)
Dept: OBGYN CLINIC | Facility: CLINIC | Age: 34
End: 2019-05-09

## 2019-05-09 ENCOUNTER — ROUTINE PRENATAL (OUTPATIENT)
Dept: OBGYN CLINIC | Facility: CLINIC | Age: 34
End: 2019-05-09
Payer: COMMERCIAL

## 2019-05-09 VITALS
SYSTOLIC BLOOD PRESSURE: 124 MMHG | DIASTOLIC BLOOD PRESSURE: 88 MMHG | HEIGHT: 65 IN | BODY MASS INDEX: 38.39 KG/M2 | WEIGHT: 230.4 LBS

## 2019-05-09 DIAGNOSIS — O99.213 OBESITY AFFECTING PREGNANCY IN THIRD TRIMESTER: ICD-10-CM

## 2019-05-09 DIAGNOSIS — Z34.93 ENCOUNTER FOR PREGNANCY RELATED EXAMINATION IN THIRD TRIMESTER: ICD-10-CM

## 2019-05-09 DIAGNOSIS — Z34.83 ENCOUNTER FOR SUPERVISION OF OTHER NORMAL PREGNANCY, THIRD TRIMESTER: Primary | ICD-10-CM

## 2019-05-09 LAB
SL AMB  POCT GLUCOSE, UA: NEGATIVE
SL AMB POCT URINE PROTEIN: NEGATIVE

## 2019-05-09 PROCEDURE — 81002 URINALYSIS NONAUTO W/O SCOPE: CPT | Performed by: NURSE PRACTITIONER

## 2019-05-09 PROCEDURE — 87653 STREP B DNA AMP PROBE: CPT | Performed by: NURSE PRACTITIONER

## 2019-05-09 PROCEDURE — PNV: Performed by: NURSE PRACTITIONER

## 2019-05-11 LAB — GP B STREP DNA SPEC QL NAA+PROBE: NORMAL

## 2019-05-13 ENCOUNTER — ROUTINE PRENATAL (OUTPATIENT)
Dept: OBGYN CLINIC | Facility: CLINIC | Age: 34
End: 2019-05-13

## 2019-05-13 VITALS — BODY MASS INDEX: 38.61 KG/M2 | DIASTOLIC BLOOD PRESSURE: 62 MMHG | SYSTOLIC BLOOD PRESSURE: 120 MMHG | WEIGHT: 232 LBS

## 2019-05-13 DIAGNOSIS — Z34.83 ENCOUNTER FOR SUPERVISION OF OTHER NORMAL PREGNANCY, THIRD TRIMESTER: Primary | ICD-10-CM

## 2019-05-13 DIAGNOSIS — O99.213 OBESITY AFFECTING PREGNANCY IN THIRD TRIMESTER: ICD-10-CM

## 2019-05-13 PROBLEM — Z3A.34 34 WEEKS GESTATION OF PREGNANCY: Status: RESOLVED | Noted: 2018-12-18 | Resolved: 2019-05-13

## 2019-05-13 PROCEDURE — PNV: Performed by: NURSE PRACTITIONER

## 2019-05-14 ENCOUNTER — TELEPHONE (OUTPATIENT)
Dept: OBGYN CLINIC | Facility: CLINIC | Age: 34
End: 2019-05-14

## 2019-05-19 ENCOUNTER — HOSPITAL ENCOUNTER (INPATIENT)
Facility: HOSPITAL | Age: 34
LOS: 1 days | Discharge: HOME | End: 2019-05-21
Attending: OBSTETRICS & GYNECOLOGY | Admitting: OBSTETRICS & GYNECOLOGY
Payer: COMMERCIAL

## 2019-05-19 DIAGNOSIS — Z3A.37 37 WEEKS GESTATION OF PREGNANCY: Primary | ICD-10-CM

## 2019-05-20 ENCOUNTER — ANESTHESIA (INPATIENT)
Dept: ANESTHESIOLOGY | Facility: HOSPITAL | Age: 34
End: 2019-05-20
Payer: COMMERCIAL

## 2019-05-20 ENCOUNTER — ANESTHESIA EVENT (INPATIENT)
Dept: ANESTHESIOLOGY | Facility: HOSPITAL | Age: 34
End: 2019-05-20
Payer: COMMERCIAL

## 2019-05-20 LAB
ABO GROUP BLD: NORMAL
BASE EXCESS BLDCOA CALC-SCNC: -1.7 MMOL/L (ref 3–11)
BASE EXCESS BLDCOV CALC-SCNC: -4.1 MMOL/L (ref 1–9)
BLD GP AB SCN SERPL QL: NEGATIVE
ERYTHROCYTE [DISTWIDTH] IN BLOOD BY AUTOMATED COUNT: 13.5 % (ref 11.6–15.1)
HCO3 BLDCOA-SCNC: 25.9 MMOL/L (ref 17.3–27.3)
HCO3 BLDCOV-SCNC: 20.3 MMOL/L (ref 12.2–28.6)
HCT VFR BLD AUTO: 37.1 % (ref 34.8–46.1)
HGB BLD-MCNC: 12.1 G/DL (ref 11.5–15.4)
MCH RBC QN AUTO: 28.8 PG (ref 26.8–34.3)
MCHC RBC AUTO-ENTMCNC: 32.6 G/DL (ref 31.4–37.4)
MCV RBC AUTO: 88 FL (ref 82–98)
O2 CT VFR BLDCOA CALC: 6.8 ML/DL
OXYHGB MFR BLDCOA: 28.9 %
OXYHGB MFR BLDCOV: 64.7 %
PCO2 BLDCOA: 54.7 MM[HG] (ref 30–60)
PCO2 BLDCOV: 36 MM HG (ref 27–43)
PH BLDCOA: 7.29 [PH] (ref 7.23–7.43)
PH BLDCOV: 7.37 [PH] (ref 7.19–7.49)
PLATELET # BLD AUTO: 256 THOUSANDS/UL (ref 149–390)
PMV BLD AUTO: 9.2 FL (ref 8.9–12.7)
PO2 BLDCOA: 16.7 MM HG (ref 5–25)
PO2 BLDCOV: 28.9 MM HG (ref 15–45)
RBC # BLD AUTO: 4.2 MILLION/UL (ref 3.81–5.12)
RH BLD: POSITIVE
RPR SER QL: NORMAL
SAO2 % BLDCOV: 15.2 ML/DL
SPECIMEN EXPIRATION DATE: NORMAL
WBC # BLD AUTO: 10.54 THOUSAND/UL (ref 4.31–10.16)

## 2019-05-20 PROCEDURE — 86850 RBC ANTIBODY SCREEN: CPT | Performed by: STUDENT IN AN ORGANIZED HEALTH CARE EDUCATION/TRAINING PROGRAM

## 2019-05-20 PROCEDURE — 86900 BLOOD TYPING SEROLOGIC ABO: CPT | Performed by: STUDENT IN AN ORGANIZED HEALTH CARE EDUCATION/TRAINING PROGRAM

## 2019-05-20 PROCEDURE — 82805 BLOOD GASES W/O2 SATURATION: CPT | Performed by: OBSTETRICS & GYNECOLOGY

## 2019-05-20 PROCEDURE — 86592 SYPHILIS TEST NON-TREP QUAL: CPT | Performed by: STUDENT IN AN ORGANIZED HEALTH CARE EDUCATION/TRAINING PROGRAM

## 2019-05-20 PROCEDURE — 4A1HXCZ MONITORING OF PRODUCTS OF CONCEPTION, CARDIAC RATE, EXTERNAL APPROACH: ICD-10-PCS | Performed by: OBSTETRICS & GYNECOLOGY

## 2019-05-20 PROCEDURE — G0463 HOSPITAL OUTPT CLINIC VISIT: HCPCS

## 2019-05-20 PROCEDURE — 99024 POSTOP FOLLOW-UP VISIT: CPT | Performed by: OBSTETRICS & GYNECOLOGY

## 2019-05-20 PROCEDURE — 0KQM0ZZ REPAIR PERINEUM MUSCLE, OPEN APPROACH: ICD-10-PCS | Performed by: OBSTETRICS & GYNECOLOGY

## 2019-05-20 PROCEDURE — NC001 PR NO CHARGE: Performed by: STUDENT IN AN ORGANIZED HEALTH CARE EDUCATION/TRAINING PROGRAM

## 2019-05-20 PROCEDURE — 59400 OBSTETRICAL CARE: CPT | Performed by: OBSTETRICS & GYNECOLOGY

## 2019-05-20 PROCEDURE — 85027 COMPLETE CBC AUTOMATED: CPT | Performed by: STUDENT IN AN ORGANIZED HEALTH CARE EDUCATION/TRAINING PROGRAM

## 2019-05-20 PROCEDURE — 99214 OFFICE O/P EST MOD 30 MIN: CPT

## 2019-05-20 PROCEDURE — 86901 BLOOD TYPING SEROLOGIC RH(D): CPT | Performed by: STUDENT IN AN ORGANIZED HEALTH CARE EDUCATION/TRAINING PROGRAM

## 2019-05-20 RX ORDER — OXYTOCIN/RINGER'S LACTATE 30/500 ML
1-30 PLASTIC BAG, INJECTION (ML) INTRAVENOUS
Status: DISCONTINUED | OUTPATIENT
Start: 2019-05-20 | End: 2019-05-20

## 2019-05-20 RX ORDER — ACETAMINOPHEN 325 MG/1
650 TABLET ORAL EVERY 6 HOURS PRN
Status: DISCONTINUED | OUTPATIENT
Start: 2019-05-20 | End: 2019-05-21 | Stop reason: HOSPADM

## 2019-05-20 RX ORDER — LIDOCAINE HYDROCHLORIDE AND EPINEPHRINE 15; 5 MG/ML; UG/ML
INJECTION, SOLUTION EPIDURAL
Status: COMPLETED | OUTPATIENT
Start: 2019-05-20 | End: 2019-05-20

## 2019-05-20 RX ORDER — DIAPER,BRIEF,INFANT-TODD,DISP
1 EACH MISCELLANEOUS AS NEEDED
Status: DISCONTINUED | OUTPATIENT
Start: 2019-05-20 | End: 2019-05-21 | Stop reason: HOSPADM

## 2019-05-20 RX ORDER — SODIUM CHLORIDE, SODIUM LACTATE, POTASSIUM CHLORIDE, CALCIUM CHLORIDE 600; 310; 30; 20 MG/100ML; MG/100ML; MG/100ML; MG/100ML
125 INJECTION, SOLUTION INTRAVENOUS CONTINUOUS
Status: DISCONTINUED | OUTPATIENT
Start: 2019-05-20 | End: 2019-05-20

## 2019-05-20 RX ORDER — CALCIUM CARBONATE 200(500)MG
1000 TABLET,CHEWABLE ORAL DAILY PRN
Status: DISCONTINUED | OUTPATIENT
Start: 2019-05-20 | End: 2019-05-21 | Stop reason: HOSPADM

## 2019-05-20 RX ORDER — ONDANSETRON 2 MG/ML
4 INJECTION INTRAMUSCULAR; INTRAVENOUS EVERY 8 HOURS PRN
Status: DISCONTINUED | OUTPATIENT
Start: 2019-05-20 | End: 2019-05-21 | Stop reason: HOSPADM

## 2019-05-20 RX ORDER — SIMETHICONE 80 MG
80 TABLET,CHEWABLE ORAL 4 TIMES DAILY PRN
Status: DISCONTINUED | OUTPATIENT
Start: 2019-05-20 | End: 2019-05-21 | Stop reason: HOSPADM

## 2019-05-20 RX ORDER — OXYCODONE HYDROCHLORIDE AND ACETAMINOPHEN 5; 325 MG/1; MG/1
2 TABLET ORAL EVERY 4 HOURS PRN
Status: DISCONTINUED | OUTPATIENT
Start: 2019-05-20 | End: 2019-05-21 | Stop reason: HOSPADM

## 2019-05-20 RX ORDER — DOCUSATE SODIUM 100 MG/1
100 CAPSULE, LIQUID FILLED ORAL 2 TIMES DAILY
Status: DISCONTINUED | OUTPATIENT
Start: 2019-05-20 | End: 2019-05-21 | Stop reason: HOSPADM

## 2019-05-20 RX ORDER — ONDANSETRON 2 MG/ML
4 INJECTION INTRAMUSCULAR; INTRAVENOUS EVERY 6 HOURS PRN
Status: DISCONTINUED | OUTPATIENT
Start: 2019-05-20 | End: 2019-05-20

## 2019-05-20 RX ORDER — OXYTOCIN/RINGER'S LACTATE 30/500 ML
250 PLASTIC BAG, INJECTION (ML) INTRAVENOUS CONTINUOUS
Status: DISCONTINUED | OUTPATIENT
Start: 2019-05-20 | End: 2019-05-21 | Stop reason: HOSPADM

## 2019-05-20 RX ORDER — DIPHENHYDRAMINE HCL 25 MG
25 TABLET ORAL EVERY 6 HOURS PRN
Status: DISCONTINUED | OUTPATIENT
Start: 2019-05-20 | End: 2019-05-21 | Stop reason: HOSPADM

## 2019-05-20 RX ORDER — OXYCODONE HYDROCHLORIDE AND ACETAMINOPHEN 5; 325 MG/1; MG/1
1 TABLET ORAL EVERY 4 HOURS PRN
Status: DISCONTINUED | OUTPATIENT
Start: 2019-05-20 | End: 2019-05-21 | Stop reason: HOSPADM

## 2019-05-20 RX ORDER — IBUPROFEN 600 MG/1
600 TABLET ORAL EVERY 6 HOURS PRN
Status: DISCONTINUED | OUTPATIENT
Start: 2019-05-20 | End: 2019-05-21 | Stop reason: HOSPADM

## 2019-05-20 RX ADMIN — LIDOCAINE HYDROCHLORIDE AND EPINEPHRINE 3 ML: 15; 5 INJECTION, SOLUTION EPIDURAL at 01:36

## 2019-05-20 RX ADMIN — SODIUM CHLORIDE, SODIUM LACTATE, POTASSIUM CHLORIDE, AND CALCIUM CHLORIDE 999 ML/HR: .6; .31; .03; .02 INJECTION, SOLUTION INTRAVENOUS at 00:30

## 2019-05-20 RX ADMIN — IBUPROFEN 600 MG: 600 TABLET ORAL at 15:58

## 2019-05-20 RX ADMIN — Medication 2 MILLI-UNITS/MIN: at 06:36

## 2019-05-20 RX ADMIN — ROPIVACAINE HYDROCHLORIDE: 2 INJECTION, SOLUTION EPIDURAL; INFILTRATION at 09:21

## 2019-05-20 RX ADMIN — BENZOCAINE AND LEVOMENTHOL: 200; 5 SPRAY TOPICAL at 13:00

## 2019-05-20 RX ADMIN — ROPIVACAINE HYDROCHLORIDE: 2 INJECTION, SOLUTION EPIDURAL; INFILTRATION at 01:47

## 2019-05-20 RX ADMIN — WITCH HAZEL 1 PAD: 500 SOLUTION RECTAL; TOPICAL at 13:00

## 2019-05-20 RX ADMIN — Medication 250 MILLI-UNITS/MIN: at 10:40

## 2019-05-20 RX ADMIN — SODIUM CHLORIDE, SODIUM LACTATE, POTASSIUM CHLORIDE, AND CALCIUM CHLORIDE 125 ML/HR: .6; .31; .03; .02 INJECTION, SOLUTION INTRAVENOUS at 09:12

## 2019-05-20 RX ADMIN — HYDROCORTISONE 1 APPLICATION: 1 CREAM TOPICAL at 13:00

## 2019-05-20 RX ADMIN — SODIUM CHLORIDE, SODIUM LACTATE, POTASSIUM CHLORIDE, AND CALCIUM CHLORIDE 125 ML/HR: .6; .31; .03; .02 INJECTION, SOLUTION INTRAVENOUS at 01:06

## 2019-05-21 VITALS
DIASTOLIC BLOOD PRESSURE: 72 MMHG | SYSTOLIC BLOOD PRESSURE: 121 MMHG | WEIGHT: 232 LBS | RESPIRATION RATE: 18 BRPM | BODY MASS INDEX: 38.65 KG/M2 | TEMPERATURE: 98.2 F | HEIGHT: 65 IN | HEART RATE: 94 BPM | OXYGEN SATURATION: 100 %

## 2019-05-21 PROBLEM — Z34.83 ENCOUNTER FOR SUPERVISION OF OTHER NORMAL PREGNANCY, THIRD TRIMESTER: Status: RESOLVED | Noted: 2019-01-15 | Resolved: 2019-05-21

## 2019-05-21 PROBLEM — O99.210 OBESITY AFFECTING PREGNANCY: Status: RESOLVED | Noted: 2018-12-18 | Resolved: 2019-05-21

## 2019-05-21 PROBLEM — Z3A.37 37 WEEKS GESTATION OF PREGNANCY: Status: RESOLVED | Noted: 2019-05-19 | Resolved: 2019-05-21

## 2019-05-21 PROCEDURE — 99024 POSTOP FOLLOW-UP VISIT: CPT | Performed by: OBSTETRICS & GYNECOLOGY

## 2019-05-21 RX ORDER — DIAPER,BRIEF,INFANT-TODD,DISP
1 EACH MISCELLANEOUS AS NEEDED
Qty: 30 G | Refills: 0 | Status: SHIPPED | OUTPATIENT
Start: 2019-05-21 | End: 2019-05-31 | Stop reason: ALTCHOICE

## 2019-05-21 RX ORDER — DOCUSATE SODIUM 100 MG/1
100 CAPSULE, LIQUID FILLED ORAL 2 TIMES DAILY
Qty: 10 CAPSULE | Refills: 0 | Status: SHIPPED | OUTPATIENT
Start: 2019-05-21 | End: 2019-05-31 | Stop reason: ALTCHOICE

## 2019-05-21 RX ORDER — ACETAMINOPHEN 325 MG/1
650 TABLET ORAL EVERY 6 HOURS PRN
Qty: 30 TABLET | Refills: 0 | Status: SHIPPED | OUTPATIENT
Start: 2019-05-21 | End: 2019-05-31 | Stop reason: ALTCHOICE

## 2019-05-21 RX ORDER — CALCIUM CARBONATE 200(500)MG
1000 TABLET,CHEWABLE ORAL DAILY PRN
Refills: 0 | Status: SHIPPED | OUTPATIENT
Start: 2019-05-21 | End: 2019-05-31 | Stop reason: ALTCHOICE

## 2019-05-21 RX ORDER — IBUPROFEN 600 MG/1
600 TABLET ORAL EVERY 6 HOURS PRN
Qty: 30 TABLET | Refills: 0 | Status: SHIPPED | OUTPATIENT
Start: 2019-05-21 | End: 2019-05-31 | Stop reason: ALTCHOICE

## 2019-05-21 RX ADMIN — IBUPROFEN 600 MG: 600 TABLET ORAL at 15:14

## 2019-05-21 RX ADMIN — IBUPROFEN 600 MG: 600 TABLET ORAL at 08:06

## 2019-05-21 RX ADMIN — DOCUSATE SODIUM 100 MG: 100 CAPSULE, LIQUID FILLED ORAL at 08:06

## 2019-05-21 RX ADMIN — IBUPROFEN 600 MG: 600 TABLET ORAL at 00:52

## 2019-05-23 ENCOUNTER — TELEPHONE (OUTPATIENT)
Dept: OBGYN CLINIC | Facility: CLINIC | Age: 34
End: 2019-05-23

## 2019-05-23 ENCOUNTER — HOSPITAL ENCOUNTER (OUTPATIENT)
Dept: ULTRASOUND IMAGING | Facility: HOSPITAL | Age: 34
Discharge: HOME | End: 2019-05-23
Attending: OBSTETRICS & GYNECOLOGY
Payer: COMMERCIAL

## 2019-05-23 DIAGNOSIS — M79.661 RIGHT CALF PAIN: ICD-10-CM

## 2019-05-23 DIAGNOSIS — M79.661 RIGHT CALF PAIN: Primary | ICD-10-CM

## 2019-05-23 PROCEDURE — 93971 EXTREMITY STUDY: CPT

## 2019-05-24 PROCEDURE — 93971 EXTREMITY STUDY: CPT | Performed by: SURGERY

## 2019-05-28 ENCOUNTER — TELEPHONE (OUTPATIENT)
Dept: OBGYN CLINIC | Facility: CLINIC | Age: 34
End: 2019-05-28

## 2019-05-29 LAB — PLACENTA IN STORAGE: NORMAL

## 2019-05-31 ENCOUNTER — POSTPARTUM VISIT (OUTPATIENT)
Dept: OBGYN CLINIC | Facility: MEDICAL CENTER | Age: 34
End: 2019-05-31

## 2019-05-31 VITALS — DIASTOLIC BLOOD PRESSURE: 74 MMHG | WEIGHT: 206 LBS | SYSTOLIC BLOOD PRESSURE: 126 MMHG | BODY MASS INDEX: 34.28 KG/M2

## 2019-05-31 DIAGNOSIS — Z48.89 SUTURE CHECK: Primary | ICD-10-CM

## 2019-05-31 PROCEDURE — 99024 POSTOP FOLLOW-UP VISIT: CPT | Performed by: PHYSICIAN ASSISTANT

## 2019-06-12 ENCOUNTER — TELEPHONE (OUTPATIENT)
Dept: OBGYN CLINIC | Facility: CLINIC | Age: 34
End: 2019-06-12

## 2019-06-14 ENCOUNTER — POSTPARTUM VISIT (OUTPATIENT)
Dept: OBGYN CLINIC | Facility: MEDICAL CENTER | Age: 34
End: 2019-06-14

## 2019-06-14 VITALS — DIASTOLIC BLOOD PRESSURE: 76 MMHG | BODY MASS INDEX: 33.45 KG/M2 | WEIGHT: 201 LBS | SYSTOLIC BLOOD PRESSURE: 124 MMHG

## 2019-06-14 PROBLEM — Z48.89 SUTURE CHECK: Status: RESOLVED | Noted: 2019-05-31 | Resolved: 2019-06-14

## 2019-06-14 PROCEDURE — 99024 POSTOP FOLLOW-UP VISIT: CPT | Performed by: PHYSICIAN ASSISTANT

## 2019-11-06 ENCOUNTER — ANNUAL EXAM (OUTPATIENT)
Dept: OBGYN CLINIC | Facility: CLINIC | Age: 34
End: 2019-11-06
Payer: COMMERCIAL

## 2019-11-06 ENCOUNTER — OFFICE VISIT (OUTPATIENT)
Dept: URGENT CARE | Facility: CLINIC | Age: 34
End: 2019-11-06
Payer: COMMERCIAL

## 2019-11-06 VITALS
DIASTOLIC BLOOD PRESSURE: 62 MMHG | SYSTOLIC BLOOD PRESSURE: 110 MMHG | BODY MASS INDEX: 32.86 KG/M2 | HEIGHT: 65 IN | WEIGHT: 197.25 LBS

## 2019-11-06 VITALS
WEIGHT: 197 LBS | HEART RATE: 80 BPM | OXYGEN SATURATION: 98 % | TEMPERATURE: 98.9 F | RESPIRATION RATE: 18 BRPM | SYSTOLIC BLOOD PRESSURE: 110 MMHG | DIASTOLIC BLOOD PRESSURE: 58 MMHG | BODY MASS INDEX: 32.78 KG/M2

## 2019-11-06 DIAGNOSIS — H92.03 EAR PAIN, BILATERAL: Primary | ICD-10-CM

## 2019-11-06 DIAGNOSIS — N90.89 VULVAR LESION: ICD-10-CM

## 2019-11-06 DIAGNOSIS — Z01.419 ENCOUNTER FOR GYNECOLOGICAL EXAMINATION WITHOUT ABNORMAL FINDING: Primary | ICD-10-CM

## 2019-11-06 PROCEDURE — 88305 TISSUE EXAM BY PATHOLOGIST: CPT | Performed by: PATHOLOGY

## 2019-11-06 PROCEDURE — S0612 ANNUAL GYNECOLOGICAL EXAMINA: HCPCS | Performed by: OBSTETRICS & GYNECOLOGY

## 2019-11-06 PROCEDURE — 99213 OFFICE O/P EST LOW 20 MIN: CPT | Performed by: PHYSICIAN ASSISTANT

## 2019-11-06 RX ORDER — OFLOXACIN 3 MG/ML
10 SOLUTION AURICULAR (OTIC) DAILY
Qty: 5 ML | Refills: 0 | Status: SHIPPED | OUTPATIENT
Start: 2019-11-06 | End: 2019-11-13

## 2019-11-06 NOTE — PROGRESS NOTES
Assessment/Plan:    Encounter for gynecological examination without abnormal finding  Pap/HPV current  Contraception vasectomy    Encourage healthy diet, exercise, Calcium 1200mg per day and at least 800 iu Vitamin D daily  Two vulvar lesions noted on right labia - excised  Nevi right mons pubis - offered removal, declines at this time       Diagnoses and all orders for this visit:    Encounter for gynecological examination without abnormal finding    Vulvar lesion  -     Biopsy  -     Tissue Exam    Other orders  -     Multiple Vitamins-Minerals (MULTIVITAMIN ADULT PO); Take by mouth          Subjective:      Patient ID: Gopi Candelario is a 36 y o  female  Patient here for yearly exam  No current complaints at this time  Age of first period: 7years old    Lmp: 10/24/19  Birth control: vasectomy  Last pap: 5/27/15 neg,hpv- (due )  Patient is not a smoker  Patient is not a drinker  Patient exercises  Vulvar warts on right labia base      Gynecologic Exam   The patient's pertinent negatives include no genital itching, genital odor, genital rash, pelvic pain, vaginal bleeding or vaginal discharge  The patient is experiencing no pain  Pertinent negatives include no chills, constipation, diarrhea, fever, frequency, nausea, painful intercourse, sore throat, urgency or vomiting  She is sexually active  Her menstrual history has been regular  The following portions of the patient's history were reviewed and updated as appropriate:   She  has a past medical history of Concussion (), Fracture, foot (), Palpitation, Varicella, and Visual impairment  She   Patient Active Problem List    Diagnosis Date Noted    Encounter for gynecological examination without abnormal finding 2019     She  has a past surgical history that includes TONSILECTOMY AND ADNOIDECTOMY; Myringotomy w/ tubes; [place] tooth extraction; Lung surgery; and Other surgical history    Her family history includes Asthma in her other and other; Breast cancer in her maternal grandmother; Crohn's disease in her mother; Depression in her other, other, and sister; Diabetes in her father, maternal grandmother, and paternal grandfather; Endometriosis in her family; Gout in her father; Hashimoto's thyroiditis in her mother; Heart disease in her maternal grandfather and other; Hypertension in her father; No Known Problems in her daughter; Other in her maternal grandmother, mother, other, and other; Parkinsonism in her mother; Seizures in her other and sister; Thyroid cancer in her mother  She  reports that she has never smoked  She has never used smokeless tobacco  She reports that she does not drink alcohol or use drugs  Current Outpatient Medications   Medication Sig Dispense Refill    Multiple Vitamins-Minerals (MULTIVITAMIN ADULT PO) Take by mouth      Prenatal Vit-DSS-Fe Fum-FA (PRENATAL 19) tablet Take 1 tablet by mouth daily       No current facility-administered medications for this visit  Current Outpatient Medications on File Prior to Visit   Medication Sig    Multiple Vitamins-Minerals (MULTIVITAMIN ADULT PO) Take by mouth    Prenatal Vit-DSS-Fe Fum-FA (PRENATAL 19) tablet Take 1 tablet by mouth daily     No current facility-administered medications on file prior to visit  She is allergic to ceclor [cefaclor] and egg shells       Review of Systems   Constitutional: Negative for activity change, appetite change, chills, fatigue and fever  HENT: Negative for rhinorrhea, sneezing and sore throat  Eyes: Negative for visual disturbance  Respiratory: Negative for cough, shortness of breath and wheezing  Cardiovascular: Negative for chest pain, palpitations and leg swelling  Gastrointestinal: Negative for abdominal distention, constipation, diarrhea, nausea and vomiting  Genitourinary: Negative for difficulty urinating, frequency, pelvic pain, urgency and vaginal discharge     Neurological: Negative for syncope and light-headedness  Objective:      /62 (BP Location: Left arm, Patient Position: Sitting, Cuff Size: Standard)   Ht 5' 5" (1 651 m)   Wt 89 5 kg (197 lb 4 oz)   LMP 10/24/2019   Breastfeeding? No   BMI 32 82 kg/m²          Physical Exam   Constitutional: She is oriented to person, place, and time  Pulmonary/Chest: Right breast exhibits no inverted nipple, no mass, no nipple discharge, no skin change and no tenderness  Left breast exhibits no inverted nipple, no mass, no nipple discharge, no skin change and no tenderness  No breast tenderness, discharge or bleeding  Breasts are symmetrical    Genitourinary: Vagina normal and uterus normal        No breast tenderness, discharge or bleeding  There is no rash, tenderness, lesion or injury on the right labia  There is no rash, tenderness, lesion or injury on the left labia  Uterus is not deviated, not enlarged, not fixed and not tender  Cervix exhibits no motion tenderness, no discharge and no friability  Right adnexum displays no mass, no tenderness and no fullness  Left adnexum displays no mass, no tenderness and no fullness  No tenderness or bleeding in the vagina  No vaginal discharge found  Neurological: She is alert and oriented to person, place, and time

## 2019-11-06 NOTE — ASSESSMENT & PLAN NOTE
Pap/HPV current  Contraception vasectomy    Encourage healthy diet, exercise, Calcium 1200mg per day and at least 800 iu Vitamin D daily  Two vulvar lesions noted on right labia - excised    Nevi right mons pubis - offered removal, declines at this time

## 2019-11-06 NOTE — PROGRESS NOTES
[facility]        NAME: Yue Turcios is a 36 y o  female  : 1985    MRN: [id number]  DATE: 2019  TIME: 3:21 PM    Assessment and Plan   Ear pain, bilateral [H92 03]  1  Ear pain, bilateral  ofloxacin (FLOXIN) 0 3 % otic solution         Patient Instructions     Follow up with PCP in 3-5 days  Proceed to  ER if symptoms worsen  Chief Complaint     Chief Complaint   Patient presents with    Earache     Pt c/o bl ears feeling clogged          History of Present Illness       41-year-old female presents for evaluation of bilateral ear fullness  Patient complaining of clear discharge from both ears  She also notes itching from both ears  She denies trauma  Denies pain  Denies change in hearing  Denies fever or chills  Review of Systems   Review of Systems   Constitutional: Negative for chills, fatigue and fever  HENT: Positive for ear discharge and ear pain  Negative for congestion, sinus pain, sore throat and trouble swallowing  Eyes: Negative for pain, discharge and redness  Respiratory: Negative for cough, chest tightness, shortness of breath and wheezing  Cardiovascular: Negative for chest pain, palpitations and leg swelling  Gastrointestinal: Negative for abdominal pain, diarrhea, nausea and vomiting  Musculoskeletal: Negative for arthralgias, joint swelling and myalgias  Skin: Negative for rash  Neurological: Negative for dizziness, weakness, numbness and headaches           Current Medications       Current Outpatient Medications:     Multiple Vitamins-Minerals (MULTIVITAMIN ADULT PO), Take by mouth, Disp: , Rfl:     ofloxacin (FLOXIN) 0 3 % otic solution, Administer 10 drops into both ears daily for 7 days, Disp: 5 mL, Rfl: 0    Prenatal Vit-DSS-Fe Fum-FA (PRENATAL 19) tablet, Take 1 tablet by mouth daily, Disp: , Rfl:     Current Allergies     Allergies as of 2019 - Reviewed 2019   Allergen Reaction Noted    Ceclor [cefaclor] Rash 03/10/2016    Egg shells Rash 12/06/2018            The following portions of the patient's history were reviewed and updated as appropriate: allergies, current medications, past family history, past medical history, past social history, past surgical history and problem list      Past Medical History:   Diagnosis Date    Concussion 2006    Fracture, foot 1998    Palpitation     Varicella     Visual impairment        Past Surgical History:   Procedure Laterality Date    LUNG SURGERY      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    MYRINGOTOMY W/ TUBES      OTHER SURGICAL HISTORY      EAR PRESSURE EQULIZATION TUBE, INSERTION     TONSILECTOMY AND ADNOIDECTOMY      WISDOM TOOTH EXTRACTION         Family History   Problem Relation Age of Onset    Crohn's disease Mother     Hashimoto's thyroiditis Mother     Thyroid cancer Mother     Other Mother         CERVICAL DYSPLASIA     Parkinsonism Mother     Hypertension Father     Gout Father     Diabetes Father         type II     Depression Sister     Seizures Sister     Diabetes Maternal Grandmother     Other Maternal Grandmother         HYSTERECTOMY    Breast cancer Maternal Grandmother     Heart disease Maternal Grandfather     Diabetes Paternal Grandfather     Asthma Other     Depression Other     Seizures Other         EPILEPSY    Other Other         NEUROCARDIOGENIC PRE -SYNCOPE     Asthma Other     Depression Other     Heart disease Other         CONGENITAL     Other Other         NEUROCARDIOGENIC PRE-SYNCOPE     Endometriosis Family     No Known Problems Daughter     Colon cancer Neg Hx     Ovarian cancer Neg Hx     Cervical cancer Neg Hx     Uterine cancer Neg Hx          Medications have been verified          Objective   /58   Pulse 80   Temp 98 9 °F (37 2 °C)   Resp 18   Wt 89 4 kg (197 lb)   LMP 10/24/2019   SpO2 98%   BMI 32 78 kg/m²        Physical Exam     Physical Exam   Constitutional: She appears well-developed and well-nourished  HENT:   Head: Normocephalic  Right Ear: Hearing and tympanic membrane normal  There is drainage (clear)  Left Ear: Hearing and tympanic membrane normal  There is drainage (clear)  Nose: No mucosal edema  Mouth/Throat: Uvula is midline and mucous membranes are normal  No posterior oropharyngeal erythema  Cardiovascular: Normal rate and regular rhythm  Pulmonary/Chest: Effort normal and breath sounds normal    Nursing note and vitals reviewed

## 2019-11-06 NOTE — PROGRESS NOTES
Biopsy  Date/Time: 11/6/2019 1:25 PM  Performed by: Nathan Ortez MD  Authorized by: Nathan Ortez MD     Procedure Details - Skin Biopsy:     Biopsy method: shave biopsy      Initial size (mm):  3    Malignancy: benign lesion       2 vulvar lesions warts versus molluscum

## 2019-11-06 NOTE — PATIENT INSTRUCTIONS

## 2020-06-03 ENCOUNTER — TRANSCRIBE ORDERS (OUTPATIENT)
Dept: ADMINISTRATIVE | Facility: HOSPITAL | Age: 35
End: 2020-06-03

## 2020-06-03 ENCOUNTER — HOSPITAL ENCOUNTER (OUTPATIENT)
Dept: RADIOLOGY | Facility: HOSPITAL | Age: 35
Discharge: HOME | End: 2020-06-03
Payer: COMMERCIAL

## 2020-06-03 DIAGNOSIS — M54.40 LOW BACK PAIN WITH SCIATICA, SCIATICA LATERALITY UNSPECIFIED, UNSPECIFIED BACK PAIN LATERALITY, UNSPECIFIED CHRONICITY: ICD-10-CM

## 2020-06-03 DIAGNOSIS — M54.40 LOW BACK PAIN WITH SCIATICA, SCIATICA LATERALITY UNSPECIFIED, UNSPECIFIED BACK PAIN LATERALITY, UNSPECIFIED CHRONICITY: Primary | ICD-10-CM

## 2020-06-03 PROCEDURE — 72110 X-RAY EXAM L-2 SPINE 4/>VWS: CPT

## 2020-06-08 ENCOUNTER — TRANSCRIBE ORDERS (OUTPATIENT)
Dept: ADMINISTRATIVE | Facility: HOSPITAL | Age: 35
End: 2020-06-08

## 2020-06-08 DIAGNOSIS — M54.41 ACUTE BACK PAIN WITH SCIATICA, RIGHT: Primary | ICD-10-CM

## 2020-06-29 ENCOUNTER — HOSPITAL ENCOUNTER (OUTPATIENT)
Dept: MRI IMAGING | Facility: HOSPITAL | Age: 35
Discharge: HOME | End: 2020-06-29
Payer: COMMERCIAL

## 2020-06-29 DIAGNOSIS — M54.41 ACUTE BACK PAIN WITH SCIATICA, RIGHT: ICD-10-CM

## 2020-06-29 PROCEDURE — 72148 MRI LUMBAR SPINE W/O DYE: CPT

## 2020-09-09 ENCOUNTER — TRANSCRIBE ORDERS (OUTPATIENT)
Dept: ADMINISTRATIVE | Facility: HOSPITAL | Age: 35
End: 2020-09-09

## 2020-09-09 DIAGNOSIS — R60.9 SWELLING: Primary | ICD-10-CM

## 2020-09-18 ENCOUNTER — HOSPITAL ENCOUNTER (OUTPATIENT)
Dept: ULTRASOUND IMAGING | Facility: HOSPITAL | Age: 35
Discharge: HOME | End: 2020-09-18
Payer: COMMERCIAL

## 2020-09-18 DIAGNOSIS — R60.9 SWELLING: ICD-10-CM

## 2020-09-18 PROCEDURE — 76536 US EXAM OF HEAD AND NECK: CPT

## 2020-09-24 ENCOUNTER — TRANSCRIBE ORDERS (OUTPATIENT)
Dept: ADMINISTRATIVE | Facility: HOSPITAL | Age: 35
End: 2020-09-24

## 2020-09-24 DIAGNOSIS — E04.1 NONTOXIC SINGLE THYROID NODULE: Primary | ICD-10-CM

## 2020-10-08 ENCOUNTER — HOSPITAL ENCOUNTER (OUTPATIENT)
Dept: ULTRASOUND IMAGING | Facility: HOSPITAL | Age: 35
Discharge: HOME | End: 2020-10-08
Admitting: RADIOLOGY
Payer: COMMERCIAL

## 2020-10-08 DIAGNOSIS — E04.1 NONTOXIC SINGLE THYROID NODULE: ICD-10-CM

## 2020-10-08 PROCEDURE — 88173 CYTOPATH EVAL FNA REPORT: CPT | Performed by: PATHOLOGY

## 2020-10-08 PROCEDURE — 10005 FNA BX W/US GDN 1ST LES: CPT

## 2020-10-08 PROCEDURE — 88172 CYTP DX EVAL FNA 1ST EA SITE: CPT | Performed by: PATHOLOGY

## 2020-10-08 RX ORDER — LIDOCAINE HYDROCHLORIDE 10 MG/ML
4 INJECTION, SOLUTION EPIDURAL; INFILTRATION; INTRACAUDAL; PERINEURAL ONCE
Status: DISCONTINUED | OUTPATIENT
Start: 2020-10-08 | End: 2020-10-12 | Stop reason: HOSPADM

## 2020-10-16 ENCOUNTER — OFFICE VISIT (OUTPATIENT)
Dept: OBGYN CLINIC | Facility: CLINIC | Age: 35
End: 2020-10-16
Payer: COMMERCIAL

## 2020-10-16 ENCOUNTER — TELEPHONE (OUTPATIENT)
Dept: OBGYN CLINIC | Facility: CLINIC | Age: 35
End: 2020-10-16

## 2020-10-16 VITALS
TEMPERATURE: 97.5 F | WEIGHT: 207.6 LBS | DIASTOLIC BLOOD PRESSURE: 74 MMHG | BODY MASS INDEX: 34.55 KG/M2 | SYSTOLIC BLOOD PRESSURE: 122 MMHG

## 2020-10-16 DIAGNOSIS — T19.2XXA RETAINED TAMPON, INITIAL ENCOUNTER: Primary | ICD-10-CM
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
## 2020-10-16 PROCEDURE — 99213 OFFICE O/P EST LOW 20 MIN: CPT | Performed by: NURSE PRACTITIONER

## 2020-11-18 ENCOUNTER — HOSPITAL ENCOUNTER (OUTPATIENT)
Dept: RADIOLOGY | Facility: HOSPITAL | Age: 35
Discharge: HOME | End: 2020-11-18
Payer: COMMERCIAL

## 2020-11-18 ENCOUNTER — TRANSCRIBE ORDERS (OUTPATIENT)
Dept: ADMINISTRATIVE | Facility: HOSPITAL | Age: 35
End: 2020-11-18

## 2020-11-18 DIAGNOSIS — M87.271: ICD-10-CM

## 2020-11-18 DIAGNOSIS — M87.271: Primary | ICD-10-CM

## 2020-11-18 PROCEDURE — 73610 X-RAY EXAM OF ANKLE: CPT

## 2020-11-18 PROCEDURE — 73630 X-RAY EXAM OF FOOT: CPT

## 2020-11-30 ENCOUNTER — ANNUAL EXAM (OUTPATIENT)
Dept: OBGYN CLINIC | Facility: CLINIC | Age: 35
End: 2020-11-30
Payer: COMMERCIAL

## 2020-11-30 ENCOUNTER — TRANSCRIBE ORDERS (OUTPATIENT)
Dept: ADMINISTRATIVE | Facility: HOSPITAL | Age: 35
End: 2020-11-30

## 2020-11-30 VITALS — SYSTOLIC BLOOD PRESSURE: 124 MMHG | WEIGHT: 218 LBS | BODY MASS INDEX: 36.28 KG/M2 | DIASTOLIC BLOOD PRESSURE: 78 MMHG

## 2020-11-30 DIAGNOSIS — Z01.419 ENCOUNTER FOR GYNECOLOGICAL EXAMINATION WITHOUT ABNORMAL FINDING: Primary | ICD-10-CM

## 2020-11-30 DIAGNOSIS — M79.671 RIGHT FOOT PAIN: Primary | ICD-10-CM

## 2020-11-30 PROBLEM — T19.2XXA RETAINED TAMPON: Status: RESOLVED | Noted: 2020-10-16 | Resolved: 2020-11-30
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
## 2020-11-30 PROCEDURE — 87624 HPV HI-RISK TYP POOLED RSLT: CPT | Performed by: OBSTETRICS & GYNECOLOGY

## 2020-11-30 PROCEDURE — S0612 ANNUAL GYNECOLOGICAL EXAMINA: HCPCS | Performed by: OBSTETRICS & GYNECOLOGY

## 2020-11-30 PROCEDURE — G0145 SCR C/V CYTO,THINLAYER,RESCR: HCPCS | Performed by: OBSTETRICS & GYNECOLOGY

## 2020-12-02 LAB
HPV HR 12 DNA CVX QL NAA+PROBE: NEGATIVE
HPV16 DNA CVX QL NAA+PROBE: NEGATIVE
HPV18 DNA CVX QL NAA+PROBE: NEGATIVE

## 2020-12-03 LAB
LAB AP GYN PRIMARY INTERPRETATION: NORMAL
Lab: NORMAL

## 2020-12-07 ENCOUNTER — HOSPITAL ENCOUNTER (OUTPATIENT)
Dept: MRI IMAGING | Facility: HOSPITAL | Age: 35
Discharge: HOME | End: 2020-12-07
Payer: COMMERCIAL

## 2020-12-07 DIAGNOSIS — M79.671 RIGHT FOOT PAIN: ICD-10-CM

## 2020-12-07 PROCEDURE — 73721 MRI JNT OF LWR EXTRE W/O DYE: CPT

## 2020-12-07 PROCEDURE — G1004 CDSM NDSC: HCPCS

## 2020-12-07 PROCEDURE — 73718 MRI LOWER EXTREMITY W/O DYE: CPT

## 2020-12-13 ENCOUNTER — NURSE TRIAGE (OUTPATIENT)
Dept: OTHER | Facility: OTHER | Age: 35
End: 2020-12-13

## 2020-12-13 DIAGNOSIS — Z20.822 EXPOSURE TO COVID-19 VIRUS: Primary | ICD-10-CM

## 2020-12-14 DIAGNOSIS — Z20.822 EXPOSURE TO COVID-19 VIRUS: ICD-10-CM

## 2020-12-14 PROCEDURE — U0003 INFECTIOUS AGENT DETECTION BY NUCLEIC ACID (DNA OR RNA); SEVERE ACUTE RESPIRATORY SYNDROME CORONAVIRUS 2 (SARS-COV-2) (CORONAVIRUS DISEASE [COVID-19]), AMPLIFIED PROBE TECHNIQUE, MAKING USE OF HIGH THROUGHPUT TECHNOLOGIES AS DESCRIBED BY CMS-2020-01-R: HCPCS | Performed by: FAMILY MEDICINE

## 2020-12-15 LAB — SARS-COV-2 RNA SPEC QL NAA+PROBE: DETECTED

## 2020-12-16 ENCOUNTER — NURSE TRIAGE (OUTPATIENT)
Dept: OTHER | Facility: OTHER | Age: 35
End: 2020-12-16

## 2021-03-31 DIAGNOSIS — Z23 ENCOUNTER FOR IMMUNIZATION: ICD-10-CM

## 2021-04-19 ENCOUNTER — TRANSCRIBE ORDERS (OUTPATIENT)
Dept: ADMINISTRATIVE | Facility: HOSPITAL | Age: 36
End: 2021-04-19

## 2021-04-20 ENCOUNTER — TRANSCRIBE ORDERS (OUTPATIENT)
Dept: ADMINISTRATIVE | Facility: HOSPITAL | Age: 36
End: 2021-04-20

## 2021-04-20 DIAGNOSIS — E04.1 NONTOXIC SINGLE THYROID NODULE: Primary | ICD-10-CM

## 2021-05-03 ENCOUNTER — TRANSCRIBE ORDERS (OUTPATIENT)
Dept: ADMINISTRATIVE | Facility: HOSPITAL | Age: 36
End: 2021-05-03

## 2021-05-03 DIAGNOSIS — E04.1 NONTOXIC UNINODULAR GOITER: Primary | ICD-10-CM

## 2021-05-06 ENCOUNTER — HOSPITAL ENCOUNTER (OUTPATIENT)
Dept: ULTRASOUND IMAGING | Facility: HOSPITAL | Age: 36
Discharge: HOME | End: 2021-05-06
Attending: INTERNAL MEDICINE
Payer: COMMERCIAL

## 2021-05-06 ENCOUNTER — HOSPITAL ENCOUNTER (OUTPATIENT)
Dept: ULTRASOUND IMAGING | Facility: HOSPITAL | Age: 36
Discharge: HOME | End: 2021-05-06

## 2021-05-06 DIAGNOSIS — E04.1 NONTOXIC UNINODULAR GOITER: ICD-10-CM

## 2021-05-06 PROCEDURE — 76536 US EXAM OF HEAD AND NECK: CPT

## 2021-11-22 ENCOUNTER — HOSPITAL ENCOUNTER (OUTPATIENT)
Dept: ULTRASOUND IMAGING | Facility: HOSPITAL | Age: 36
Discharge: HOME | End: 2021-11-22
Payer: COMMERCIAL

## 2021-11-22 DIAGNOSIS — E04.1 NONTOXIC SINGLE THYROID NODULE: ICD-10-CM

## 2021-11-22 PROCEDURE — 76536 US EXAM OF HEAD AND NECK: CPT

## 2021-12-06 ENCOUNTER — TELEPHONE (OUTPATIENT)
Dept: HEMATOLOGY ONCOLOGY | Facility: CLINIC | Age: 36
End: 2021-12-06

## 2021-12-14 ENCOUNTER — ANNUAL EXAM (OUTPATIENT)
Dept: OBGYN CLINIC | Facility: CLINIC | Age: 36
End: 2021-12-14
Payer: COMMERCIAL

## 2021-12-14 VITALS — DIASTOLIC BLOOD PRESSURE: 80 MMHG | BODY MASS INDEX: 35.81 KG/M2 | SYSTOLIC BLOOD PRESSURE: 124 MMHG | WEIGHT: 215.2 LBS

## 2021-12-14 DIAGNOSIS — Z01.419 ENCOUNTER FOR ANNUAL ROUTINE GYNECOLOGICAL EXAMINATION: Primary | ICD-10-CM

## 2021-12-14 DIAGNOSIS — Z01.419 ENCOUNTER FOR GYNECOLOGICAL EXAMINATION WITHOUT ABNORMAL FINDING: ICD-10-CM

## 2021-12-14 PROBLEM — M25.871 ANKLE IMPINGEMENT SYNDROME, RIGHT: Status: ACTIVE | Noted: 2021-02-26

## 2021-12-14 PROBLEM — E04.9 SUBSTERNAL GOITER: Status: ACTIVE | Noted: 2021-12-13

## 2021-12-14 PROBLEM — E04.1 NODULE OF LEFT LOBE OF THYROID GLAND: Status: ACTIVE | Noted: 2021-11-16

## 2021-12-14 PROCEDURE — S0612 ANNUAL GYNECOLOGICAL EXAMINA: HCPCS | Performed by: OBSTETRICS & GYNECOLOGY

## 2022-01-05 ENCOUNTER — TELEPHONE (OUTPATIENT)
Dept: OTHER | Facility: OTHER | Age: 37
End: 2022-01-05

## 2022-01-05 PROBLEM — E07.9 THYROID MASS: Status: ACTIVE | Noted: 2021-11-16

## 2022-01-05 NOTE — TELEPHONE ENCOUNTER
Pt called in stating that her CT scan which was taken in [place] is needed for her upcoming appt on 1/7/2022  She needs the office to fax over a medical record request to [phone] and they will release her CT scan to the office  Please call pt back if you have any questions

## 2022-01-06 ENCOUNTER — APPOINTMENT (OUTPATIENT)
Dept: LAB | Facility: HOSPITAL | Age: 37
End: 2022-01-06
Payer: COMMERCIAL

## 2022-01-06 ENCOUNTER — OFFICE VISIT (OUTPATIENT)
Dept: LAB | Facility: HOSPITAL | Age: 37
End: 2022-01-06
Payer: COMMERCIAL

## 2022-01-06 DIAGNOSIS — Z01.818 PRE-OP TESTING: ICD-10-CM

## 2022-01-06 LAB
ANION GAP SERPL CALCULATED.3IONS-SCNC: 9 MMOL/L (ref 4–13)
ATRIAL RATE: 61 BPM
BASOPHILS # BLD AUTO: 0.06 THOUSANDS/ΜL (ref 0–0.1)
BASOPHILS NFR BLD AUTO: 1 % (ref 0–1)
BUN SERPL-MCNC: 10 MG/DL (ref 5–25)
CALCIUM SERPL-MCNC: 8.7 MG/DL (ref 8.3–10.1)
CHLORIDE SERPL-SCNC: 102 MMOL/L (ref 100–108)
CO2 SERPL-SCNC: 30 MMOL/L (ref 21–32)
CREAT SERPL-MCNC: 0.8 MG/DL (ref 0.6–1.3)
EOSINOPHIL # BLD AUTO: 0.13 THOUSAND/ΜL (ref 0–0.61)
EOSINOPHIL NFR BLD AUTO: 2 % (ref 0–6)
ERYTHROCYTE [DISTWIDTH] IN BLOOD BY AUTOMATED COUNT: 13.5 % (ref 11.6–15.1)
GFR SERPL CREATININE-BSD FRML MDRD: 95 ML/MIN/1.73SQ M
GLUCOSE SERPL-MCNC: 87 MG/DL (ref 65–140)
HCT VFR BLD AUTO: 39 % (ref 34.8–46.1)
HGB BLD-MCNC: 12.7 G/DL (ref 11.5–15.4)
IMM GRANULOCYTES # BLD AUTO: 0.04 THOUSAND/UL (ref 0–0.2)
IMM GRANULOCYTES NFR BLD AUTO: 0 % (ref 0–2)
LYMPHOCYTES # BLD AUTO: 2.58 THOUSANDS/ΜL (ref 0.6–4.47)
LYMPHOCYTES NFR BLD AUTO: 29 % (ref 14–44)
MCH RBC QN AUTO: 28.7 PG (ref 26.8–34.3)
MCHC RBC AUTO-ENTMCNC: 32.6 G/DL (ref 31.4–37.4)
MCV RBC AUTO: 88 FL (ref 82–98)
MONOCYTES # BLD AUTO: 0.55 THOUSAND/ΜL (ref 0.17–1.22)
MONOCYTES NFR BLD AUTO: 6 % (ref 4–12)
NEUTROPHILS # BLD AUTO: 5.56 THOUSANDS/ΜL (ref 1.85–7.62)
NEUTS SEG NFR BLD AUTO: 62 % (ref 43–75)
NRBC BLD AUTO-RTO: 0 /100 WBCS
P AXIS: 57 DEGREES
PLATELET # BLD AUTO: 348 THOUSANDS/UL (ref 149–390)
PMV BLD AUTO: 8.6 FL (ref 8.9–12.7)
POTASSIUM SERPL-SCNC: 3.7 MMOL/L (ref 3.5–5.3)
PR INTERVAL: 142 MS
QRS AXIS: 3 DEGREES
QRSD INTERVAL: 96 MS
QT INTERVAL: 422 MS
QTC INTERVAL: 424 MS
RBC # BLD AUTO: 4.42 MILLION/UL (ref 3.81–5.12)
SODIUM SERPL-SCNC: 141 MMOL/L (ref 136–145)
T WAVE AXIS: 50 DEGREES
VENTRICULAR RATE: 61 BPM
WBC # BLD AUTO: 8.92 THOUSAND/UL (ref 4.31–10.16)

## 2022-01-06 PROCEDURE — 93005 ELECTROCARDIOGRAM TRACING: CPT

## 2022-01-06 PROCEDURE — 93010 ELECTROCARDIOGRAM REPORT: CPT | Performed by: INTERNAL MEDICINE

## 2022-01-06 PROCEDURE — 36415 COLL VENOUS BLD VENIPUNCTURE: CPT

## 2022-01-06 PROCEDURE — 85025 COMPLETE CBC W/AUTO DIFF WBC: CPT

## 2022-01-06 PROCEDURE — 80048 BASIC METABOLIC PNL TOTAL CA: CPT

## 2022-01-06 NOTE — TELEPHONE ENCOUNTER
Patient states she have not obtained the disc of her images and would like to know if she can keep her appt or will need to reschedule  Best call back [phone]

## 2022-01-06 NOTE — TELEPHONE ENCOUNTER
Called patient and left a message to let her know we have the CT reports and that she would just need to bring the disc with her to her appointment

## 2022-01-06 NOTE — TELEPHONE ENCOUNTER
Spoke to Alona Hernandez, he still wants to see the patient tomorrow   Left a message and let the patient know

## 2022-01-07 ENCOUNTER — TELEPHONE (OUTPATIENT)
Dept: HEMATOLOGY ONCOLOGY | Facility: CLINIC | Age: 37
End: 2022-01-07

## 2022-01-07 NOTE — TELEPHONE ENCOUNTER
Appointment Cancellation Or Reschedule     Person calling in Patient    Provider Dr Pedro Hatch   Office Visit Date and Time 01/07 at 9:15am   Office Visit Location [facility]   Did patient want to reschedule their office appointment? If so, when was it scheduled to? no   Is this patient calling to reschedule an infusion appointment? no   When is their next infusion appointment? no   Is this patient a Chemo patient? no   Reason for Cancellation or Reschedule Weather, Patient will call back to reschedule      If the patient is a treatment patient, please route this to the office nurse  If the patient is not on treatment, please route to the office MA

## 2022-09-02 ENCOUNTER — OFFICE VISIT (OUTPATIENT)
Dept: URGENT CARE | Facility: CLINIC | Age: 37
End: 2022-09-02
Payer: COMMERCIAL

## 2022-09-02 VITALS
HEART RATE: 73 BPM | TEMPERATURE: 97.7 F | SYSTOLIC BLOOD PRESSURE: 110 MMHG | RESPIRATION RATE: 16 BRPM | DIASTOLIC BLOOD PRESSURE: 64 MMHG | BODY MASS INDEX: 38.61 KG/M2 | WEIGHT: 232 LBS | OXYGEN SATURATION: 99 %

## 2022-09-02 DIAGNOSIS — L23.5 ALLERGIC DERMATITIS DUE TO OTHER CHEMICAL PRODUCT: Primary | ICD-10-CM

## 2022-09-02 PROCEDURE — 99213 OFFICE O/P EST LOW 20 MIN: CPT | Performed by: STUDENT IN AN ORGANIZED HEALTH CARE EDUCATION/TRAINING PROGRAM

## 2022-09-02 RX ORDER — DIAPER,BRIEF,INFANT-TODD,DISP
EACH MISCELLANEOUS 4 TIMES DAILY PRN
Qty: 30 G | Refills: 0 | Status: SHIPPED | OUTPATIENT
Start: 2022-09-02 | End: 2022-09-07

## 2022-09-02 NOTE — PROGRESS NOTES
[facility]        NAME: Isabel Julian is a 40 y o  female  : 1985    MRN: [id number]  DATE: 2022  TIME: 12:01 PM    Assessment and Orders   Allergic dermatitis due to other chemical product [L23 5]  1  Allergic dermatitis due to other chemical product  hydrocortisone 1 % cream         Plan and Discussion      Symptoms and exam consistent with allergic dermatitis  - likely from new face soap    Rx for symptom relief: hydrocortisone cream     Discussed reasons to report immediately to ED for evaluation: tongue swelling, throat closing, difficulty breathing     Risks and benefits discussed  Patient understands and agrees with the plan  Follow up with PCP  Chief Complaint     Chief Complaint   Patient presents with    Rash     On neck x3 weeks  Not applying meds  Very itchy  Denies SOB or difficulty breathing  History of Present Illness       Rash  This is a new problem  The current episode started 1 to 4 weeks ago (3 weeks ago)  The problem has been gradually worsening since onset  The affected locations include the neck  The rash is characterized by redness and swelling  She was exposed to a new detergent/soap (new face wash)  Associated symptoms include itching  Pertinent negatives include no anorexia, congestion, cough, decreased physical activity, decreased responsiveness, decreased sleep, drinking less, diarrhea, facial edema, fatigue, fever, joint pain, rhinorrhea, shortness of breath, sore throat or vomiting  Past treatments include anti-itch cream  The treatment provided no relief  Review of Systems   Review of Systems   Constitutional: Negative for decreased responsiveness, fatigue and fever  HENT: Negative for congestion, rhinorrhea and sore throat  Respiratory: Negative for cough and shortness of breath  Gastrointestinal: Negative for anorexia, diarrhea and vomiting  Musculoskeletal: Negative for joint pain     Skin: Positive for itching and rash          Current Medications       Current Outpatient Medications:     hydrocortisone 1 % cream, Apply topically 4 (four) times a day as needed for rash for up to 5 days, Disp: 30 g, Rfl: 0    Multiple Vitamins-Minerals (IMMUNE SYSTEM BOOSTER PO), Take by mouth, Disp: , Rfl:     Multiple Vitamins-Minerals (MULTIVITAMIN ADULT PO), Take by mouth, Disp: , Rfl:     Prenatal Vit-DSS-Fe Fum-FA (PRENATAL 19) tablet, Take 1 tablet by mouth daily (Patient not taking: Reported on 9/2/2022), Disp: , Rfl:     Vitamin D-Vitamin K (VITAMIN K2-VITAMIN D3 PO), , Disp: , Rfl:     Current Allergies     Allergies as of 09/02/2022 - Reviewed 09/02/2022   Allergen Reaction Noted    Ceclor [cefaclor] Rash 03/10/2016    Egg shells - food allergy Rash 12/06/2018            The following portions of the patient's history were reviewed and updated as appropriate: allergies, current medications, past family history, past medical history, past social history, past surgical history and problem list      Past Medical History:   Diagnosis Date    Concussion 2006    Fracture, foot 1998    Palpitation     Varicella     Visual impairment        Past Surgical History:   Procedure Laterality Date    LUNG SURGERY      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    MYRINGOTOMY W/ TUBES      OTHER SURGICAL HISTORY      EAR PRESSURE EQULIZATION TUBE, INSERTION     TONSILECTOMY AND ADNOIDECTOMY      US GUIDED THYROID BIOPSY  10/8/2020    WISDOM TOOTH EXTRACTION         Family History   Problem Relation Age of Onset    Crohn's disease Mother     Hashimoto's thyroiditis Mother     Thyroid cancer Mother     Other Mother         CERVICAL DYSPLASIA     Parkinsonism Mother     Hypertension Father     Gout Father     Diabetes Father         type II     Depression Sister     Seizures Sister     Diabetes Maternal Grandmother     Other Maternal Grandmother         HYSTERECTOMY    Breast cancer Maternal Grandmother     Heart disease Maternal Grandfather     Diabetes Paternal Grandfather     Asthma Other     Depression Other     Seizures Other         EPILEPSY    Other Other         NEUROCARDIOGENIC PRE -SYNCOPE     Asthma Other     Depression Other     Heart disease Other         CONGENITAL     Other Other         NEUROCARDIOGENIC PRE-SYNCOPE     Endometriosis Family     No Known Problems Daughter     Colon cancer Neg Hx     Ovarian cancer Neg Hx     Cervical cancer Neg Hx     Uterine cancer Neg Hx          Medications have been verified  Objective   /64   Pulse 73   Temp 97 7 °F (36 5 °C)   Resp 16   Wt 105 kg (232 lb)   SpO2 99%   BMI 38 61 kg/m²   No LMP recorded  Physical Exam     Physical Exam  Constitutional:       General: She is not in acute distress  Appearance: She is not ill-appearing  HENT:      Head: Normocephalic and atraumatic  Right Ear: External ear normal       Left Ear: External ear normal       Nose: Nose normal    Neck:        Comments: Area of redness and swelling, raised    Pulmonary:      Effort: Pulmonary effort is normal  No respiratory distress  Skin:     Findings: Rash present  Neurological:      General: No focal deficit present  Mental Status: She is alert and oriented to person, place, and time  Psychiatric:         Mood and Affect: Mood normal          Behavior: Behavior normal          Thought Content:  Thought content normal          Judgment: Judgment normal                Maximilian Johnson DO

## 2022-11-04 ENCOUNTER — OFFICE VISIT (OUTPATIENT)
Dept: OBGYN CLINIC | Facility: CLINIC | Age: 37
End: 2022-11-04

## 2022-11-04 VITALS
SYSTOLIC BLOOD PRESSURE: 110 MMHG | HEIGHT: 65 IN | BODY MASS INDEX: 38.15 KG/M2 | DIASTOLIC BLOOD PRESSURE: 72 MMHG | WEIGHT: 229 LBS

## 2022-11-04 DIAGNOSIS — L65.9 ALOPECIA: ICD-10-CM

## 2022-11-04 DIAGNOSIS — Z90.09 HISTORY OF LOBECTOMY OF THYROID: Primary | ICD-10-CM

## 2022-11-04 DIAGNOSIS — R63.5 WEIGHT GAIN: ICD-10-CM

## 2022-11-04 DIAGNOSIS — R68.89 COLD INTOLERANCE: ICD-10-CM

## 2022-11-04 DIAGNOSIS — R68.89 HEAT INTOLERANCE: ICD-10-CM

## 2022-11-04 LAB
T3FREE SERPL-MCNC: 3.4 PG/ML (ref 2.3–4.2)
T4 FREE SERPL-MCNC: 1.1 NG/DL (ref 0.8–1.8)
TSH SERPL-ACNC: 1.61 MIU/L

## 2022-11-04 NOTE — ASSESSMENT & PLAN NOTE
Advised patient symptoms may be related to hypothyroidism  Perimenopause may contribute to mood swings, fatigue, and hot flashes but with regular periods, it is unlikely to be caused by estrogen dysfunction  Will consider further hormone testing if thyroid studies are within normal range  May need to see endocrine

## 2022-11-04 NOTE — PATIENT INSTRUCTIONS
Hypothyroidism   AMBULATORY CARE:   Hypothyroidism  is a condition that develops when the thyroid gland does not make enough thyroid hormone  Thyroid hormones help control body temperature, heart rate, growth, and weight  Common signs and symptoms include the following: The signs and symptoms may develop slowly, sometimes over several years  Exhaustion, depression, or irritability    Sensitivity to cold    Muscle aches, headaches, weakness, or trouble concentrating    Dry, flaky skin, brittle nails, or thinning hair    Recent weight gain without overeating, or constipation    Heavy or irregular monthly periods    Puffiness around your eyes or swelling in your hands and feet    Low heart rate, changes in your blood pressure    Call your local emergency number (911 in the [address] [facility]) or have someone call if:   You have sudden chest pain or shortness of breath  You have a seizure  You feel like you are going to faint  Seek care immediately if:   You have diarrhea, tremors, or trouble sleeping  Your legs, ankles, or feet are swollen  Call your doctor or endocrinologist if:   You have a fever  You have chills, a cough, or feel weak and achy  You have pain and swelling in your muscles and joints  Your skin is itchy, swollen, or you have a rash  Your signs and symptoms return or get worse, even after treatment  You have questions or concerns about your condition or care  Treatment:  Thyroid hormone replacement medicine may bring your thyroid hormone level back to normal  You will need to take this medicine for the rest of your life to control hypothyroidism  It is important to take the medicine every day as directed  You will be given instructions for what to do if you miss a dose  Ask your healthcare provider for more information on other medicines you may need  Manage hypothyroidism:   Get more iodine  The thyroid gland uses iodine to work correctly and to make thyroid hormones   Your healthcare provider may tell you to eat foods that are rich in iodine  He or she will tell you how much of these foods to eat  Milk and seafood are good sources of iodine  You may also need iodine supplements  Keep track of your blood pressure and weight:      Check your blood pressure  and write it down as often as directed  It is important to measure your blood pressure on the same arm and in the same position each time  Keep track of your blood pressure readings, along with the date and time you took them  Take this record with you to your followup visits  Weigh yourself daily  before breakfast after you urinate  Weight gain may be a sign of extra fluid in your body  Keep track of your daily weights and take the record with you to your followup visits  Follow up with your doctor or endocrinologist as directed: You may need to return for more blood tests to check your thyroid hormone level  This will show if you are getting the right amount of thyroid medicine  Write down your questions so you remember to ask them during your visits  © Copyright [employer] 2022 Information is for End User's use only and may not be sold, redistributed or otherwise used for commercial purposes  All illustrations and images included in CareNotes® are the copyrighted property of A D [employer] M [employer] Inc  or [employer]  The above information is an  only  It is not intended as medical advice for individual conditions or treatments  Talk to your doctor, nurse or pharmacist before following any medical regimen to see if it is safe and effective for you

## 2022-11-07 ENCOUNTER — TELEPHONE (OUTPATIENT)
Dept: OBGYN CLINIC | Facility: CLINIC | Age: 37
End: 2022-11-07

## 2022-11-07 NOTE — TELEPHONE ENCOUNTER
Spoke to pt and reviewed results and recommendations from their labs- per  Payton-   She did not see mychart msg- relayed info to her

## 2022-11-07 NOTE — TELEPHONE ENCOUNTER
Pt is seeing lab results in [facility] from her visit 11/4 w/Payton Molina  Please advise

## 2022-12-02 ENCOUNTER — TELEPHONE (OUTPATIENT)
Dept: OBGYN CLINIC | Facility: CLINIC | Age: 37
End: 2022-12-02

## 2022-12-02 NOTE — TELEPHONE ENCOUNTER
[facility] calling to discuss lab billing from 11/4/22  [facility] is now billing pt due to hair loss listed as primary dx for testing  EOB being sent to you in [facility], as I see primary dx listed as Z90 09, history of lobectomy of thyroid   Pt would like a call if we can help her

## 2023-01-04 ENCOUNTER — ANNUAL EXAM (OUTPATIENT)
Dept: OBGYN CLINIC | Facility: CLINIC | Age: 38
End: 2023-01-04

## 2023-01-04 VITALS
DIASTOLIC BLOOD PRESSURE: 70 MMHG | WEIGHT: 235.4 LBS | SYSTOLIC BLOOD PRESSURE: 110 MMHG | HEIGHT: 65 IN | BODY MASS INDEX: 39.22 KG/M2

## 2023-01-04 DIAGNOSIS — Z01.419 ENCOUNTER FOR GYNECOLOGICAL EXAMINATION WITHOUT ABNORMAL FINDING: Primary | ICD-10-CM

## 2023-01-04 RX ORDER — LEVOTHYROXINE SODIUM 0.05 MG/1
50 TABLET ORAL DAILY
COMMUNITY
Start: 2022-12-20

## 2023-01-04 RX ORDER — MELOXICAM 15 MG/1
15 TABLET ORAL DAILY
COMMUNITY
Start: 2022-12-20

## 2023-01-04 NOTE — ASSESSMENT & PLAN NOTE
Pap/HPV current    Recent changes in thyroid medication dosing may be impacting cycle regularity   Will continue to monitor cycles, if once thyroid is regulated cycles are still erratic may need further evaluation

## 2023-01-04 NOTE — PROGRESS NOTES
Assessment/Plan:    Encounter for gynecological examination without abnormal finding  Pap/HPV current    Recent changes in thyroid medication dosing may be impacting cycle regularity  Will continue to monitor cycles, if once thyroid is regulated cycles are still erratic may need further evaluation       Diagnoses and all orders for this visit:    Encounter for gynecological examination without abnormal finding    Other orders  -     meloxicam (MOBIC) 15 mg tablet; Take 15 mg by mouth daily As directed  -     levothyroxine 50 mcg tablet; Take 50 mcg by mouth daily  -     Diclofenac Sodium (VOLTAREN) 1 %; APPLY 2 GRAMS TO THE AFFECTED AREA(S) BY TOPICAL ROUTE 2-4 TIMES PER DAY          Subjective:      Patient ID: Hilary Lin is a 40 y o  female  Patient presents for a routine annual visit  Menarche- 11Y/O  Last Pap Smear-  20 neg/neg  LMP- 22  Birth control- had vasectomy    Non smoker  Social drinker  Currently sexually active  MGM with breast cancer    Pt reports a change in her period in December  Started Synthroid 22  Usually gets period in the beginning of the month but got at the end  Read this was a normal side effect to the medication  Also would like to know when she should start mammograms due to family history  Gynecologic Exam  She reports no genital itching, genital lesions, genital odor, genital rash, pelvic pain, vaginal bleeding or vaginal discharge  The patient is experiencing no pain  Pertinent negatives include no chills, constipation, diarrhea, fever, nausea, sore throat or vomiting  The following portions of the patient's history were reviewed and updated as appropriate:   She  has a past medical history of Concussion (), Fracture, foot (), Palpitation, Varicella, and Visual impairment    She   Patient Active Problem List    Diagnosis Date Noted   • History of lobectomy of thyroid 2022   • Substernal goiter 2021   • Thyroid mass 11/16/2021   • Ankle impingement syndrome, right 02/26/2021   • Encounter for gynecological examination without abnormal finding 11/06/2019     She  has a past surgical history that includes TONSILECTOMY AND ADNOIDECTOMY; Myringotomy w/ tubes; [place] tooth extraction; Lung surgery; Other surgical history; and US guided thyroid biopsy (10/8/2020)  Her family history includes Asthma in her other and other; Breast cancer in her maternal grandmother; Cancer in her maternal grandmother and mother; Crohn's disease in her mother; Depression in her other, other, and sister; Diabetes in her father, maternal grandmother, and paternal grandfather; Endometriosis in her family; Gout in her father; Hashimoto's thyroiditis in her mother; Heart disease in her maternal grandfather and other; Hypertension in her father; [place] / Andres [place] in her mother; No Known Problems in her daughter; Other in her maternal grandmother, mother, other, and other; Parkinsonism in her mother; Seizures in her other and sister; Thyroid cancer in her mother; Thyroid disease in her mother  She  reports that she has never smoked  She has never used smokeless tobacco  She reports that she does not drink alcohol and does not use drugs  Current Outpatient Medications   Medication Sig Dispense Refill   • Diclofenac Sodium (VOLTAREN) 1 % APPLY 2 GRAMS TO THE AFFECTED AREA(S) BY TOPICAL ROUTE 2-4 TIMES PER DAY     • levothyroxine 50 mcg tablet Take 50 mcg by mouth daily     • meloxicam (MOBIC) 15 mg tablet Take 15 mg by mouth daily As directed     • Multiple Vitamins-Minerals (MULTIVITAMIN ADULT PO) Take by mouth     • hydrocortisone 1 % cream Apply topically 4 (four) times a day as needed for rash for up to 5 days (Patient not taking: Reported on 11/4/2022) 30 g 0   • Vitamin D-Vitamin K (VITAMIN K2-VITAMIN D3 PO)  (Patient not taking: Reported on 9/2/2022)       No current facility-administered medications for this visit       Current Outpatient Medications on File Prior to Visit   Medication Sig   • Diclofenac Sodium (VOLTAREN) 1 % APPLY 2 GRAMS TO THE AFFECTED AREA(S) BY TOPICAL ROUTE 2-4 TIMES PER DAY   • levothyroxine 50 mcg tablet Take 50 mcg by mouth daily   • meloxicam (MOBIC) 15 mg tablet Take 15 mg by mouth daily As directed   • Multiple Vitamins-Minerals (MULTIVITAMIN ADULT PO) Take by mouth   • hydrocortisone 1 % cream Apply topically 4 (four) times a day as needed for rash for up to 5 days (Patient not taking: Reported on 11/4/2022)   • Vitamin D-Vitamin K (VITAMIN K2-VITAMIN D3 PO)  (Patient not taking: Reported on 9/2/2022)     No current facility-administered medications on file prior to visit  She is allergic to ceclor [cefaclor] and egg shells - food allergy       Review of Systems   Constitutional: Negative for activity change, appetite change, chills, fatigue and fever  HENT: Negative for rhinorrhea, sneezing and sore throat  Eyes: Negative for visual disturbance  Respiratory: Negative for cough, shortness of breath and wheezing  Cardiovascular: Negative for chest pain, palpitations and leg swelling  Gastrointestinal: Negative for abdominal distention, constipation, diarrhea, nausea and vomiting  Genitourinary: Negative for difficulty urinating, pelvic pain and vaginal discharge  Neurological: Negative for syncope and light-headedness  Objective:      /70 (BP Location: Left arm, Patient Position: Sitting, Cuff Size: Standard)   Ht 5' 5" (1 651 m)   Wt 107 kg (235 lb 6 4 oz)   LMP 12/23/2022 (Exact Date)   BMI 39 17 kg/m²          Physical Exam  Chest:   Breasts:     Breasts are symmetrical       Right: No inverted nipple, mass, nipple discharge, skin change or tenderness  Left: No inverted nipple, mass, nipple discharge, skin change or tenderness  Genitourinary:     Labia:         Right: No rash, tenderness, lesion or injury  Left: No rash, tenderness, lesion or injury         Vagina: Normal  No vaginal discharge, tenderness or bleeding  Cervix: No cervical motion tenderness, discharge or friability  Uterus: Not deviated, not enlarged, not fixed and not tender  Adnexa:         Right: No mass, tenderness or fullness  Left: No mass, tenderness or fullness  Neurological:      Mental Status: She is alert and oriented to person, place, and time

## 2023-09-26 ENCOUNTER — HOSPITAL ENCOUNTER (EMERGENCY)
Facility: HOSPITAL | Age: 38
Discharge: HOME | End: 2023-09-26
Attending: EMERGENCY MEDICINE
Payer: COMMERCIAL

## 2023-09-26 ENCOUNTER — APPOINTMENT (EMERGENCY)
Dept: CT IMAGING | Facility: HOSPITAL | Age: 38
End: 2023-09-26
Payer: COMMERCIAL

## 2023-09-26 VITALS
DIASTOLIC BLOOD PRESSURE: 53 MMHG | HEART RATE: 83 BPM | WEIGHT: 235 LBS | BODY MASS INDEX: 39.11 KG/M2 | OXYGEN SATURATION: 98 % | TEMPERATURE: 98.4 F | SYSTOLIC BLOOD PRESSURE: 95 MMHG | RESPIRATION RATE: 18 BRPM

## 2023-09-26 DIAGNOSIS — K86.9 PANCREATIC LESION: ICD-10-CM

## 2023-09-26 DIAGNOSIS — N20.1 LEFT URETERAL STONE: Primary | ICD-10-CM

## 2023-09-26 LAB
ALBUMIN SERPL BCP-MCNC: 4.4 G/DL (ref 3.5–5)
ALP SERPL-CCNC: 88 U/L (ref 34–104)
ALT SERPL W P-5'-P-CCNC: 18 U/L (ref 7–52)
ANION GAP SERPL CALCULATED.3IONS-SCNC: 13 MMOL/L
AST SERPL W P-5'-P-CCNC: 15 U/L (ref 13–39)
BACTERIA UR QL AUTO: ABNORMAL /HPF
BASOPHILS # BLD AUTO: 0.04 THOUSANDS/ÂΜL (ref 0–0.1)
BASOPHILS NFR BLD AUTO: 0 % (ref 0–1)
BILIRUB SERPL-MCNC: 0.48 MG/DL (ref 0.2–1)
BILIRUB UR QL STRIP: NEGATIVE
BUN SERPL-MCNC: 12 MG/DL (ref 5–25)
CALCIUM SERPL-MCNC: 9.6 MG/DL (ref 8.4–10.2)
CHLORIDE SERPL-SCNC: 102 MMOL/L (ref 96–108)
CLARITY UR: ABNORMAL
CO2 SERPL-SCNC: 24 MMOL/L (ref 21–32)
COLOR UR: ABNORMAL
CREAT SERPL-MCNC: 0.88 MG/DL (ref 0.6–1.3)
EOSINOPHIL # BLD AUTO: 0.05 THOUSAND/ÂΜL (ref 0–0.61)
EOSINOPHIL NFR BLD AUTO: 0 % (ref 0–6)
ERYTHROCYTE [DISTWIDTH] IN BLOOD BY AUTOMATED COUNT: 14 % (ref 11.6–15.1)
EXT PREGNANCY TEST URINE: NEGATIVE
EXT. CONTROL: NORMAL
GFR SERPL CREATININE-BSD FRML MDRD: 84 ML/MIN/1.73SQ M
GLUCOSE SERPL-MCNC: 135 MG/DL (ref 65–140)
GLUCOSE UR STRIP-MCNC: NEGATIVE MG/DL
HCT VFR BLD AUTO: 39.9 % (ref 34.8–46.1)
HGB BLD-MCNC: 13.3 G/DL (ref 11.5–15.4)
HGB UR QL STRIP.AUTO: NEGATIVE
IMM GRANULOCYTES # BLD AUTO: 0.07 THOUSAND/UL (ref 0–0.2)
IMM GRANULOCYTES NFR BLD AUTO: 1 % (ref 0–2)
KETONES UR STRIP-MCNC: ABNORMAL MG/DL
LEUKOCYTE ESTERASE UR QL STRIP: NEGATIVE
LIPASE SERPL-CCNC: 19 U/L (ref 11–82)
LYMPHOCYTES # BLD AUTO: 2.32 THOUSANDS/ÂΜL (ref 0.6–4.47)
LYMPHOCYTES NFR BLD AUTO: 21 % (ref 14–44)
MCH RBC QN AUTO: 28.5 PG (ref 26.8–34.3)
MCHC RBC AUTO-ENTMCNC: 33.3 G/DL (ref 31.4–37.4)
MCV RBC AUTO: 86 FL (ref 82–98)
MONOCYTES # BLD AUTO: 0.67 THOUSAND/ÂΜL (ref 0.17–1.22)
MONOCYTES NFR BLD AUTO: 6 % (ref 4–12)
MUCOUS THREADS UR QL AUTO: ABNORMAL
NEUTROPHILS # BLD AUTO: 8.09 THOUSANDS/ÂΜL (ref 1.85–7.62)
NEUTS SEG NFR BLD AUTO: 72 % (ref 43–75)
NITRITE UR QL STRIP: NEGATIVE
NON-SQ EPI CELLS URNS QL MICRO: ABNORMAL /HPF
NRBC BLD AUTO-RTO: 0 /100 WBCS
PH UR STRIP.AUTO: 5.5 [PH]
PLATELET # BLD AUTO: 410 THOUSANDS/UL (ref 149–390)
PMV BLD AUTO: 8.8 FL (ref 8.9–12.7)
POTASSIUM SERPL-SCNC: 3.4 MMOL/L (ref 3.5–5.3)
PROT SERPL-MCNC: 8.1 G/DL (ref 6.4–8.4)
PROT UR STRIP-MCNC: ABNORMAL MG/DL
RBC # BLD AUTO: 4.66 MILLION/UL (ref 3.81–5.12)
RBC #/AREA URNS AUTO: ABNORMAL /HPF
SODIUM SERPL-SCNC: 139 MMOL/L (ref 135–147)
SP GR UR STRIP.AUTO: 1.02 (ref 1–1.03)
UROBILINOGEN UR STRIP-ACNC: <2 MG/DL
WBC # BLD AUTO: 11.24 THOUSAND/UL (ref 4.31–10.16)
WBC #/AREA URNS AUTO: ABNORMAL /HPF

## 2023-09-26 PROCEDURE — 99284 EMERGENCY DEPT VISIT MOD MDM: CPT

## 2023-09-26 PROCEDURE — 96361 HYDRATE IV INFUSION ADD-ON: CPT

## 2023-09-26 PROCEDURE — 85025 COMPLETE CBC W/AUTO DIFF WBC: CPT | Performed by: PHYSICIAN ASSISTANT

## 2023-09-26 PROCEDURE — 81001 URINALYSIS AUTO W/SCOPE: CPT | Performed by: PHYSICIAN ASSISTANT

## 2023-09-26 PROCEDURE — 82360 CALCULUS ASSAY QUANT: CPT | Performed by: PHYSICIAN ASSISTANT

## 2023-09-26 PROCEDURE — 96374 THER/PROPH/DIAG INJ IV PUSH: CPT

## 2023-09-26 PROCEDURE — 96376 TX/PRO/DX INJ SAME DRUG ADON: CPT

## 2023-09-26 PROCEDURE — 99285 EMERGENCY DEPT VISIT HI MDM: CPT | Performed by: PHYSICIAN ASSISTANT

## 2023-09-26 PROCEDURE — 80053 COMPREHEN METABOLIC PANEL: CPT | Performed by: PHYSICIAN ASSISTANT

## 2023-09-26 PROCEDURE — 83690 ASSAY OF LIPASE: CPT | Performed by: PHYSICIAN ASSISTANT

## 2023-09-26 PROCEDURE — 96375 TX/PRO/DX INJ NEW DRUG ADDON: CPT

## 2023-09-26 PROCEDURE — 36415 COLL VENOUS BLD VENIPUNCTURE: CPT | Performed by: PHYSICIAN ASSISTANT

## 2023-09-26 PROCEDURE — 74177 CT ABD & PELVIS W/CONTRAST: CPT

## 2023-09-26 PROCEDURE — 81025 URINE PREGNANCY TEST: CPT | Performed by: PHYSICIAN ASSISTANT

## 2023-09-26 RX ORDER — TAMSULOSIN HYDROCHLORIDE 0.4 MG/1
0.4 CAPSULE ORAL
Qty: 5 CAPSULE | Refills: 0 | Status: SHIPPED | OUTPATIENT
Start: 2023-09-26 | End: 2023-09-26 | Stop reason: CLARIF

## 2023-09-26 RX ORDER — ONDANSETRON 4 MG/1
4 TABLET, ORALLY DISINTEGRATING ORAL EVERY 6 HOURS PRN
Qty: 20 TABLET | Refills: 0 | Status: SHIPPED | OUTPATIENT
Start: 2023-09-26

## 2023-09-26 RX ORDER — HYDROMORPHONE HCL/PF 1 MG/ML
1 SYRINGE (ML) INJECTION ONCE
Status: COMPLETED | OUTPATIENT
Start: 2023-09-26 | End: 2023-09-26

## 2023-09-26 RX ORDER — TAMSULOSIN HYDROCHLORIDE 0.4 MG/1
0.4 CAPSULE ORAL ONCE
Status: COMPLETED | OUTPATIENT
Start: 2023-09-26 | End: 2023-09-26

## 2023-09-26 RX ORDER — ONDANSETRON 2 MG/ML
4 INJECTION INTRAMUSCULAR; INTRAVENOUS ONCE
Status: COMPLETED | OUTPATIENT
Start: 2023-09-26 | End: 2023-09-26

## 2023-09-26 RX ORDER — OXYCODONE HYDROCHLORIDE 5 MG/1
5 TABLET ORAL EVERY 6 HOURS PRN
Qty: 12 TABLET | Refills: 0 | Status: SHIPPED | OUTPATIENT
Start: 2023-09-26 | End: 2023-09-29

## 2023-09-26 RX ORDER — KETOROLAC TROMETHAMINE 30 MG/ML
15 INJECTION, SOLUTION INTRAMUSCULAR; INTRAVENOUS ONCE
Status: COMPLETED | OUTPATIENT
Start: 2023-09-26 | End: 2023-09-26

## 2023-09-26 RX ORDER — KETOROLAC TROMETHAMINE 30 MG/ML
15 INJECTION, SOLUTION INTRAMUSCULAR; INTRAVENOUS ONCE
Status: DISCONTINUED | OUTPATIENT
Start: 2023-09-26 | End: 2023-09-26

## 2023-09-26 RX ADMIN — KETOROLAC TROMETHAMINE 15 MG: 30 INJECTION, SOLUTION INTRAMUSCULAR; INTRAVENOUS at 11:49

## 2023-09-26 RX ADMIN — HYDROMORPHONE HYDROCHLORIDE 1 MG: 1 INJECTION, SOLUTION INTRAMUSCULAR; INTRAVENOUS; SUBCUTANEOUS at 12:47

## 2023-09-26 RX ADMIN — HYDROMORPHONE HYDROCHLORIDE 1 MG: 1 INJECTION, SOLUTION INTRAMUSCULAR; INTRAVENOUS; SUBCUTANEOUS at 11:48

## 2023-09-26 RX ADMIN — IOHEXOL 100 ML: 350 INJECTION, SOLUTION INTRAVENOUS at 12:18

## 2023-09-26 RX ADMIN — ONDANSETRON 4 MG: 2 INJECTION INTRAMUSCULAR; INTRAVENOUS at 11:49

## 2023-09-26 RX ADMIN — SODIUM CHLORIDE 1000 ML: 0.9 INJECTION, SOLUTION INTRAVENOUS at 13:14

## 2023-09-26 RX ADMIN — KETOROLAC TROMETHAMINE 15 MG: 30 INJECTION, SOLUTION INTRAMUSCULAR; INTRAVENOUS at 12:47

## 2023-09-26 RX ADMIN — TAMSULOSIN HYDROCHLORIDE 0.4 MG: 0.4 CAPSULE ORAL at 14:58

## 2023-09-26 RX ADMIN — SODIUM CHLORIDE 1000 ML: 0.9 INJECTION, SOLUTION INTRAVENOUS at 11:49

## 2023-09-26 NOTE — ED PROVIDER NOTES
History  Chief Complaint   Patient presents with   • Abdominal Pain   • Back Pain     Pt reports lower left quad abd pain radiating into lower back. C/o dysuria as well. 47yo female with a history of hypothyroidism presenting for evaluation of flank pain. She was at work this morning when she had an abrupt onset of left flank pain around 9:30am. Pain radiates to the left lower quadrant and is severe. Pain comes in waves. She reports feeling the urge to urinate. She is also having nausea and vomiting. No prior history of similar symptoms. No fevers, chills, vaginal bleeding, vaginal discharge. History provided by:  Patient   used: No        Prior to Admission Medications   Prescriptions Last Dose Informant Patient Reported? Taking?    Diclofenac Sodium (VOLTAREN) 1 %   Yes No   Sig: APPLY 2 GRAMS TO THE AFFECTED AREA(S) BY TOPICAL ROUTE 2-4 TIMES PER DAY   Multiple Vitamins-Minerals (MULTIVITAMIN ADULT PO)  Self Yes No   Sig: Take by mouth   Vitamin D-Vitamin K (VITAMIN K2-VITAMIN D3 PO)   Yes No   Patient not taking: Reported on 9/2/2022   hydrocortisone 1 % cream   No No   Sig: Apply topically 4 (four) times a day as needed for rash for up to 5 days   Patient not taking: Reported on 11/4/2022   levothyroxine 50 mcg tablet   Yes No   Sig: Take 50 mcg by mouth daily   meloxicam (MOBIC) 15 mg tablet   Yes No   Sig: Take 15 mg by mouth daily As directed      Facility-Administered Medications: None       Past Medical History:   Diagnosis Date   • Concussion 2006   • Fracture, foot 1998   • Palpitation    • Varicella    • Visual impairment        Past Surgical History:   Procedure Laterality Date   • LUNG SURGERY      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   • MYRINGOTOMY W/ TUBES     • OTHER SURGICAL HISTORY      EAR PRESSURE EQULIZATION TUBE, INSERTION    • TONSILECTOMY AND ADNOIDECTOMY     • US GUIDED THYROID BIOPSY  10/8/2020   • WISDOM TOOTH EXTRACTION         Family History   Problem Relation Age of Onset   • Crohn's disease Mother    • Hashimoto's thyroiditis Mother    • Thyroid cancer Mother    • Other Mother         CERVICAL DYSPLASIA    • Parkinsonism Mother    • Cancer Mother         Thyroid Cancer   • Miscarriages / Ozella Knuckles Mother    • Thyroid disease Mother    • Hypertension Father    • Gout Father    • Diabetes Father         type II    • Depression Sister    • Seizures Sister    • Diabetes Maternal Grandmother    • Other Maternal Grandmother         HYSTERECTOMY   • Breast cancer Maternal Grandmother    • Cancer Maternal Grandmother         Thyroid Cancer and Hodgkin Lymphoma   • Heart disease Maternal Grandfather    • Diabetes Paternal Grandfather    • Asthma Other    • Depression Other    • Seizures Other         EPILEPSY   • Other Other         NEUROCARDIOGENIC PRE -SYNCOPE    • Asthma Other    • Depression Other    • Heart disease Other         CONGENITAL    • Other Other         NEUROCARDIOGENIC PRE-SYNCOPE    • Endometriosis Family    • No Known Problems Daughter    • Colon cancer Neg Hx    • Ovarian cancer Neg Hx    • Cervical cancer Neg Hx    • Uterine cancer Neg Hx      I have reviewed and agree with the history as documented. E-Cigarette/Vaping   • E-Cigarette Use Never User      E-Cigarette/Vaping Substances   • Nicotine No    • THC No    • CBD No    • Flavoring No    • Other No    • Unknown No      Social History     Tobacco Use   • Smoking status: Never   • Smokeless tobacco: Never   Vaping Use   • Vaping Use: Never used   Substance Use Topics   • Alcohol use: No   • Drug use: Never       Review of Systems   Constitutional: Positive for diaphoresis. Negative for chills and fever. HENT: Negative for drooling and voice change. Eyes: Negative for discharge and redness. Respiratory: Negative for shortness of breath and stridor. Cardiovascular: Negative for chest pain and leg swelling. Gastrointestinal: Positive for abdominal pain, nausea and vomiting.    Genitourinary: Positive for flank pain and urgency. Musculoskeletal: Negative for neck pain and neck stiffness. Skin: Negative for color change and rash. Neurological: Negative for seizures and syncope. Psychiatric/Behavioral: Negative for confusion. The patient is not nervous/anxious. All other systems reviewed and are negative. Physical Exam  Physical Exam  Vitals and nursing note reviewed. Constitutional:       General: She is in acute distress. Appearance: She is well-developed. She is diaphoretic. Comments: Patient diaphoretic in acute pain   HENT:      Head: Normocephalic and atraumatic. Right Ear: External ear normal.      Left Ear: External ear normal.      Nose: Nose normal.   Eyes:      General: No scleral icterus. Right eye: No discharge. Left eye: No discharge. Conjunctiva/sclera: Conjunctivae normal.   Cardiovascular:      Rate and Rhythm: Normal rate and regular rhythm. Heart sounds: Normal heart sounds. No murmur heard. Pulmonary:      Effort: Pulmonary effort is normal. No respiratory distress. Breath sounds: Normal breath sounds. No stridor. No wheezing or rales. Abdominal:      General: Bowel sounds are normal. There is no distension. Palpations: Abdomen is soft. Tenderness: There is abdominal tenderness in the left lower quadrant. There is no right CVA tenderness, left CVA tenderness or guarding. Musculoskeletal:         General: No deformity. Normal range of motion. Cervical back: Normal range of motion and neck supple. Lymphadenopathy:      Cervical: No cervical adenopathy. Skin:     General: Skin is warm. Neurological:      Mental Status: She is alert. She is not disoriented. GCS: GCS eye subscore is 4. GCS verbal subscore is 5. GCS motor subscore is 6.    Psychiatric:         Behavior: Behavior normal.         Vital Signs  ED Triage Vitals   Temperature Pulse Respirations Blood Pressure SpO2   09/26/23 1131 09/26/23 1131 09/26/23 1131 09/26/23 1131 09/26/23 1131   98.4 °F (36.9 °C) 84 18 141/66 100 %      Temp src Heart Rate Source Patient Position - Orthostatic VS BP Location FiO2 (%)   -- 09/26/23 1131 -- -- --    Monitor         Pain Score       09/26/23 1148       10 - Worst Possible Pain           Vitals:    09/26/23 1200 09/26/23 1230 09/26/23 1300 09/26/23 1400   BP: 137/85 124/66 99/56 95/53   Pulse: 63 67 70 83         Visual Acuity      ED Medications  Medications   ondansetron (ZOFRAN) injection 4 mg (4 mg Intravenous Given 9/26/23 1149)   sodium chloride 0.9 % bolus 1,000 mL (0 mL Intravenous Stopped 9/26/23 1458)   HYDROmorphone (DILAUDID) injection 1 mg (1 mg Intravenous Given 9/26/23 1148)   ketorolac (TORADOL) injection 15 mg (15 mg Intravenous Given 9/26/23 1149)   iohexol (OMNIPAQUE) 350 MG/ML injection (MULTI-DOSE) 100 mL (100 mL Intravenous Given 9/26/23 1218)   HYDROmorphone (DILAUDID) injection 1 mg (1 mg Intravenous Given 9/26/23 1247)   ketorolac (TORADOL) injection 15 mg (15 mg Intravenous Given 9/26/23 1247)   sodium chloride 0.9 % bolus 1,000 mL (0 mL Intravenous Stopped 9/26/23 1457)   tamsulosin (FLOMAX) capsule 0.4 mg (0.4 mg Oral Given 9/26/23 1458)       Diagnostic Studies  Results Reviewed     Procedure Component Value Units Date/Time    Amara Benito analysis [[id number]] Collected: 09/26/23 1514    Lab Status:  In process Specimen: Calculus from Calculi Updated: 09/26/23 1524    Urine Microscopic [[id number]]  (Abnormal) Collected: 09/26/23 1313    Lab Status: Final result Specimen: Urine, Clean Catch Updated: 09/26/23 1352     RBC, UA 2-4 /hpf      WBC, UA 4-10 /hpf      Epithelial Cells Innumerable /hpf      Bacteria, UA Occasional /hpf      MUCUS THREADS Occasional    UA w Reflex to Microscopic w Reflex to Culture [[id number]]  (Abnormal) Collected: 09/26/23 1313    Lab Status: Final result Specimen: Urine, Clean Catch Updated: 09/26/23 1344     Color, UA Light Yellow     Clarity, UA Turbid Specific Gravity, UA 1.023     pH, UA 5.5     Leukocytes, UA Negative     Nitrite, UA Negative     Protein, UA Trace mg/dl      Glucose, UA Negative mg/dl      Ketones, UA 60 (2+) mg/dl      Urobilinogen, UA <2.0 mg/dl      Bilirubin, UA Negative     Occult Blood, UA Negative    Comprehensive metabolic panel [[id number]]  (Abnormal) Collected: 09/26/23 1156    Lab Status: Final result Specimen: Blood from Arm, Right Updated: 09/26/23 1224     Sodium 139 mmol/L      Potassium 3.4 mmol/L      Chloride 102 mmol/L      CO2 24 mmol/L      ANION GAP 13 mmol/L      BUN 12 mg/dL      Creatinine 0.88 mg/dL      Glucose 135 mg/dL      Calcium 9.6 mg/dL      AST 15 U/L      ALT 18 U/L      Alkaline Phosphatase 88 U/L      Total Protein 8.1 g/dL      Albumin 4.4 g/dL      Total Bilirubin 0.48 mg/dL      eGFR 84 ml/min/1.73sq m     Narrative:      [place] guidelines for Chronic Kidney Disease (CKD):   •  Stage 1 with normal or high GFR (GFR > 90 mL/min/1.73 square meters)  •  Stage 2 Mild CKD (GFR = 60-89 mL/min/1.73 square meters)  •  Stage 3A Moderate CKD (GFR = 45-59 mL/min/1.73 square meters)  •  Stage 3B Moderate CKD (GFR = 30-44 mL/min/1.73 square meters)  •  Stage 4 Severe CKD (GFR = 15-29 mL/min/1.73 square meters)  •  Stage 5 End Stage CKD (GFR <15 mL/min/1.73 square meters)  Note: GFR calculation is accurate only with a steady state creatinine    Lipase [[id number]]  (Normal) Collected: 09/26/23 1156    Lab Status: Final result Specimen: Blood from Arm, Right Updated: 09/26/23 1224     Lipase 19 u/L     CBC and differential [[id number]]  (Abnormal) Collected: 09/26/23 1156    Lab Status: Final result Specimen: Blood from Arm, Right Updated: 09/26/23 1204     WBC 11.24 Thousand/uL      RBC 4.66 Million/uL      Hemoglobin 13.3 g/dL      Hematocrit 39.9 %      MCV 86 fL      MCH 28.5 pg      MCHC 33.3 g/dL      RDW 14.0 %      MPV 8.8 fL      Platelets 332 Thousands/uL      nRBC 0 /100 WBCs Neutrophils Relative 72 %      Immat GRANS % 1 %      Lymphocytes Relative 21 %      Monocytes Relative 6 %      Eosinophils Relative 0 %      Basophils Relative 0 %      Neutrophils Absolute 8.09 Thousands/µL      Immature Grans Absolute 0.07 Thousand/uL      Lymphocytes Absolute 2.32 Thousands/µL      Monocytes Absolute 0.67 Thousand/µL      Eosinophils Absolute 0.05 Thousand/µL      Basophils Absolute 0.04 Thousands/µL     POCT pregnancy, urine [[id number]]  (Normal) Resulted: 09/26/23 1145    Lab Status: Final result Updated: 09/26/23 1153     EXT Preg Test, Ur Negative     Control Valid                 CT abdomen pelvis with contrast   Final Result by Skyla Miller MD (09/26 0491)      1. Mild left hydroureteronephrosis due to a 2 mm obstructing calculus at the left ureterovesicular junction. 2. There appears to be a somewhat ill-defined hypoattenuating lesion in the pancreatic tail measuring 1.2 x 1.2 x 1.1 cm. Recommend nonemergent contrast-enhanced MR abdomen with MRCP. 3. Cholelithiasis without pericholecystic inflammation. The study was marked in [facility] for immediate notification. Resident: Stephanie Marx, the attending radiologist, have reviewed the images and agree with the final report above. Workstation performed: [id number]                    Procedures  Procedures         ED Course  ED Course as of 09/26/23 1641   Tue Sep 26, 2023   1513 Patient passed stone. Will order stone analysis to facilitate urology f/u. 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 Started abruptly while at work. Associated with nausea and urinary urgency. She is afebrile and hemodynamically stable. She is diaphoretic in acute distress on initial exam. No signs of peritonitis on abdominal exam.    Initial ED plan: Check abdominal labs, UA, Upreg, and CT abdomen. IV Dilaudid, Toradol, Zofran, and fluid bolus for symptoms.     Final assessment: Labs reveal a leukocytosis with a WBC of 11.2. Remainder of labs unremarkable including normal renal function. No signs of infection on urinalysis. CT shows a 2mm stone in the L UVJ. There is also a pancreatic lesion seen incidentally for which outpatient MRI is recommended. Patient feeling significantly improved on re-evaluation and pain resolved. She is stable for discharge. She was notified of incidental finding and need for f/u imaging. Supportive care discussed. Scripts provided for Flomax, Zofran, and oxycodone. Advised close PCP and urology follow-up. ED return precautions discussed. Patient expressed understanding and is agreeable to plan. Patient discharged in stable condition. After patient was put up for discharge, I was notified by nursing staff that patient passed the stone. Will send stone for analysis to help facilitate urology follow up. Left ureteral stone: acute illness or injury  Amount and/or Complexity of Data Reviewed  Labs: ordered. Radiology: ordered. Risk  Prescription drug management. Disposition  Final diagnoses:   Left ureteral stone   Pancreatic lesion     Time reflects when diagnosis was documented in both MDM as applicable and the Disposition within this note     Time User Action Codes Description Comment    9/26/2023  2:45 PM [facility] , [address] [N20.1] Left ureteral stone     9/26/2023  2:45 PM [facility] , [address] [K86.9] Pancreatic lesion       ED Disposition     ED Disposition   Discharge    Condition   Stable    Date/Time   Tue Sep 26, 2023  2:45 PM    Comment   Stephanie Mcguire discharge to home/self care.                Follow-up Information     Follow up With Specialties Details Why Contact Info Additional [address] [facility] Schedule an appointment as soon as possible for a visit   [address] [place] [phone] [address] [facility], [address] [place], [place], [address] Emergency Department Emergency Medicine  If symptoms worsen [address] [facility] Emergency Department, [place], [place], [address]          Discharge Medication List as of 9/26/2023  2:49 PM      START taking these medications    Details   ondansetron (Zofran ODT) 4 mg disintegrating tablet Take 1 tablet (4 mg total) by mouth every 6 (six) hours as needed for nausea or vomiting, Starting Tue 9/26/2023, Normal      oxyCODONE (ROXICODONE) 5 immediate release tablet Take 1 tablet (5 mg total) by mouth every 6 (six) hours as needed for severe pain for up to 3 days Max Daily Amount: 20 mg, Starting Tue 9/26/2023, Until Fri 9/29/2023 at 2359, Normal      tamsulosin (FLOMAX) 0.4 mg Take 1 capsule (0.4 mg total) by mouth daily with dinner, Starting Tue 9/26/2023, Normal         CONTINUE these medications which have NOT CHANGED    Details   Diclofenac Sodium (VOLTAREN) 1 % APPLY 2 GRAMS TO THE AFFECTED AREA(S) BY TOPICAL ROUTE 2-4 TIMES PER DAY, Historical Med      hydrocortisone 1 % cream Apply topically 4 (four) times a day as needed for rash for up to 5 days, Starting Fri 9/2/2022, Until Wed 9/7/2022 at 2359, Normal      levothyroxine 50 mcg tablet Take 50 mcg by mouth daily, Starting Tue 12/20/2022, Historical Med      meloxicam (MOBIC) 15 mg tablet Take 15 mg by mouth daily As directed, Starting Tue 12/20/2022, Historical Med      Multiple Vitamins-Minerals (MULTIVITAMIN ADULT PO) Take by mouth, Historical Med      Vitamin D-Vitamin K (VITAMIN K2-VITAMIN D3 PO) Starting Wed 9/1/2021, Historical Med                 PDMP Review       Value Time User    PDMP Reviewed  Yes 9/26/2023  2:47 PM Abril Perla PA-C          ED Provider  Electronically Signed by           Abril Perla PA-C  09/26/23 2737

## 2023-09-26 NOTE — DISCHARGE INSTRUCTIONS
Drink plenty of fluids. Take Flomax and strain your urine until stone has passed. Take Tylenol 650mg and ibuprofen 600mg every 6 hours as needed for pain. Take oxycodone only as needed for severe breakthrough pain. Please follow-up with urology. Return to the ER with any worsening symptoms, uncontrolled pain, fevers. Your CT results:  1. Mild left hydroureteronephrosis due to a 2 mm obstructing calculus at the left ureterovesicular junction. 2. There appears to be a somewhat ill-defined hypoattenuating lesion in the pancreatic tail measuring 1.2 x 1.2 x 1.1 cm. Recommend nonemergent contrast-enhanced MR abdomen with MRCP.

## 2023-09-26 NOTE — Clinical Note
Evens Khan was seen and treated in our emergency department on 9/26/2023. No restrictions            Diagnosis: Kidney stone    Stephanie  may return to work on return date. She may return on this date: 09/28/2023         If you have any questions or concerns, please don't hesitate to call.       Annie Cates PA-C    ______________________________           _______________          _______________  Hospital Representative                              Date                                Time

## 2023-10-02 LAB
CALCIUM OXALATE DIHYDRATE MFR STONE IR: 20 %
COLOR STONE: NORMAL
COM MFR STONE: 80 %
COMMENT-STONE3: NORMAL
COMPOSITION: NORMAL
LABORATORY COMMENT REPORT: NORMAL
PHOTO: NORMAL
SIZE STONE: NORMAL MM
SPEC SOURCE SUBJ: NORMAL
STONE ANALYSIS-IMP: NORMAL
WT STONE: 6 MG

## 2023-10-11 ENCOUNTER — TELEPHONE (OUTPATIENT)
Age: 38
End: 2023-10-11

## 2023-10-11 NOTE — TELEPHONE ENCOUNTER
Patient states that she passed the kidney stone well she was still in the ED. They just did not take the referral off her chart. She also said she will call back if she experiences any more issues.  But for now, she does not want to see Urology

## 2024-01-15 ENCOUNTER — TELEPHONE (OUTPATIENT)
Dept: OBGYN CLINIC | Facility: CLINIC | Age: 39
End: 2024-01-15

## 2024-01-15 NOTE — TELEPHONE ENCOUNTER
Pt has a yearly scheduled for 1/23 in the morning hours but also has an MRI set for the afternoon.She wants to make sure the annual will not effect her MRI. She would like a call to answer concerns.Please advise.

## 2024-01-15 NOTE — TELEPHONE ENCOUNTER
Left voice message to see what concerns were but pelvic exam and pap I wouldn't think would interfere with MRI but I will ask KTM

## 2024-01-22 NOTE — ASSESSMENT & PLAN NOTE
Pap/HPV current    Encourage healthy diet, exercise, Calcium 1200mg per day and at least 800 iu Vitamin D daily.

## 2024-01-23 ENCOUNTER — ANNUAL EXAM (OUTPATIENT)
Dept: OBGYN CLINIC | Facility: CLINIC | Age: 39
End: 2024-01-23
Payer: COMMERCIAL

## 2024-01-23 VITALS
DIASTOLIC BLOOD PRESSURE: 68 MMHG | WEIGHT: 222 LBS | HEIGHT: 65 IN | BODY MASS INDEX: 36.99 KG/M2 | SYSTOLIC BLOOD PRESSURE: 108 MMHG

## 2024-01-23 DIAGNOSIS — Z01.419 ENCOUNTER FOR GYNECOLOGICAL EXAMINATION WITHOUT ABNORMAL FINDING: Primary | ICD-10-CM

## 2024-01-23 PROBLEM — N62 MACROMASTIA: Status: ACTIVE | Noted: 2023-07-06

## 2024-01-23 PROBLEM — E78.2 MIXED HYPERLIPIDEMIA: Status: ACTIVE | Noted: 2023-07-06

## 2024-01-23 PROBLEM — R79.82 ELEVATED C-REACTIVE PROTEIN (CRP): Status: ACTIVE | Noted: 2023-01-05

## 2024-01-23 PROBLEM — E07.9 THYROID MASS: Status: RESOLVED | Noted: 2021-11-16 | Resolved: 2024-01-23

## 2024-01-23 PROBLEM — E89.0 POSTOPERATIVE HYPOTHYROIDISM: Status: ACTIVE | Noted: 2023-01-05

## 2024-01-23 PROBLEM — Z90.09 HISTORY OF LOBECTOMY OF THYROID: Status: RESOLVED | Noted: 2022-11-04 | Resolved: 2024-01-23

## 2024-01-23 PROCEDURE — S0612 ANNUAL GYNECOLOGICAL EXAMINA: HCPCS | Performed by: OBSTETRICS & GYNECOLOGY

## 2024-01-23 NOTE — PROGRESS NOTES
Assessment/Plan:    Encounter for gynecological examination without abnormal finding  Pap/HPV current    Encourage healthy diet, exercise, Calcium 1200mg per day and at least 800 iu Vitamin D daily.       Diagnoses and all orders for this visit:    Encounter for gynecological examination without abnormal finding          Subjective:      Patient ID: Stephanie Mcguire is a 39 y.o. female.    Patient presents for a routine annual visit  Menarche- 11Y/O  Last Pap Smear- 20 neg/neg  LMP-24  Birth control-  had vasectomy    Non smoker  Social drinker  Currently sexually active  MFM with breast cancer     No concerns/questions for today's visit    Kidney stone this year, on evaluation pancreatic lesion noted, begin followed by GI    Gynecologic Exam  She reports no genital itching, genital lesions, genital odor, genital rash, pelvic pain, vaginal bleeding or vaginal discharge. The patient is experiencing no pain. Pertinent negatives include no chills, constipation, diarrhea, fever, nausea, sore throat or vomiting.     The following portions of the patient's history were reviewed and updated as appropriate: She  has a past medical history of Concussion (), Fracture, foot (), Palpitation, Varicella, and Visual impairment.  She   Patient Active Problem List    Diagnosis Date Noted    Macromastia 2023    Mixed hyperlipidemia 2023    Elevated C-reactive protein (CRP) 2023    Postoperative hypothyroidism 2023    Substernal goiter 2021    Ankle impingement syndrome, right 2021    Encounter for gynecological examination without abnormal finding 2019     She  has a past surgical history that includes TONSILECTOMY AND ADNOIDECTOMY; Myringotomy w/ tubes; [place] tooth extraction; Lung surgery; Other surgical history; US guided thyroid biopsy (10/08/2020); and Thyroidectomy, partial ().  Her family history includes Ankylosing spondylitis in her mother and  sister; Asthma in her other and other; Breast cancer in her maternal grandmother; Cancer in her maternal grandmother and mother; Crohn's disease in her mother; Depression in her other, other, and sister; Diabetes in her father, maternal grandmother, and paternal grandfather; Endometriosis in her family; Gout in her father; Hashimoto's thyroiditis in her mother; Heart disease in her maternal grandfather and other; Hypertension in her father; Miscarriages / Stillbirths in her mother; No Known Problems in her daughter; Other in her maternal grandmother, mother, other, and other; Seizures in her other and sister; Thyroid cancer in her mother; Thyroid disease in her mother.  She  reports that she has never smoked. She has never used smokeless tobacco. She reports that she does not drink alcohol and does not use drugs.  Current Outpatient Medications   Medication Sig Dispense Refill    levothyroxine 50 mcg tablet Take 25 mcg by mouth daily      Multiple Vitamins-Minerals (MULTIVITAMIN ADULT PO) Take by mouth      Diclofenac Sodium (VOLTAREN) 1 % APPLY 2 GRAMS TO THE AFFECTED AREA(S) BY TOPICAL ROUTE 2-4 TIMES PER DAY (Patient not taking: Reported on 1/23/2024)      hydrocortisone 1 % cream Apply topically 4 (four) times a day as needed for rash for up to 5 days (Patient not taking: Reported on 11/4/2022) 30 g 0    meloxicam (MOBIC) 15 mg tablet Take 15 mg by mouth daily As directed (Patient not taking: Reported on 1/23/2024)      ondansetron (Zofran ODT) 4 mg disintegrating tablet Take 1 tablet (4 mg total) by mouth every 6 (six) hours as needed for nausea or vomiting (Patient not taking: Reported on 1/23/2024) 20 tablet 0    Vitamin D-Vitamin K (VITAMIN K2-VITAMIN D3 PO)  (Patient not taking: Reported on 9/2/2022)       No current facility-administered medications for this visit.     Current Outpatient Medications on File Prior to Visit   Medication Sig    levothyroxine 50 mcg tablet Take 25 mcg by mouth daily     "Multiple Vitamins-Minerals (MULTIVITAMIN ADULT PO) Take by mouth    Diclofenac Sodium (VOLTAREN) 1 % APPLY 2 GRAMS TO THE AFFECTED AREA(S) BY TOPICAL ROUTE 2-4 TIMES PER DAY (Patient not taking: Reported on 1/23/2024)    hydrocortisone 1 % cream Apply topically 4 (four) times a day as needed for rash for up to 5 days (Patient not taking: Reported on 11/4/2022)    meloxicam (MOBIC) 15 mg tablet Take 15 mg by mouth daily As directed (Patient not taking: Reported on 1/23/2024)    ondansetron (Zofran ODT) 4 mg disintegrating tablet Take 1 tablet (4 mg total) by mouth every 6 (six) hours as needed for nausea or vomiting (Patient not taking: Reported on 1/23/2024)    Vitamin D-Vitamin K (VITAMIN K2-VITAMIN D3 PO)  (Patient not taking: Reported on 9/2/2022)     No current facility-administered medications on file prior to visit.     She is allergic to ceclor [cefaclor] and egg shells - food allergy..      Review of Systems   Constitutional:  Negative for activity change, appetite change, chills, fatigue and fever.   HENT:  Negative for rhinorrhea, sneezing and sore throat.    Eyes:  Negative for visual disturbance.   Respiratory:  Negative for cough, shortness of breath and wheezing.    Cardiovascular:  Negative for chest pain, palpitations and leg swelling.   Gastrointestinal:  Negative for abdominal distention, constipation, diarrhea, nausea and vomiting.   Genitourinary:  Negative for difficulty urinating, pelvic pain and vaginal discharge.   Neurological:  Negative for syncope and light-headedness.         Objective:      /68 (BP Location: Left arm, Patient Position: Sitting, Cuff Size: Standard)   Ht 5' 4.5\" (1.638 m)   Wt 101 kg (222 lb)   LMP 01/04/2024 (Exact Date)   BMI 37.52 kg/m²          Physical Exam  Chest:   Breasts:     Breasts are symmetrical.      Right: No inverted nipple, mass, nipple discharge, skin change or tenderness.      Left: No inverted nipple, mass, nipple discharge, skin change or " tenderness.   Genitourinary:     Labia:         Right: No rash, tenderness, lesion or injury.         Left: No rash, tenderness, lesion or injury.       Vagina: Normal. No vaginal discharge, erythema, tenderness or bleeding.      Cervix: No cervical motion tenderness, discharge, friability, erythema or cervical bleeding.      Uterus: Not deviated, not enlarged, not fixed and not tender.       Adnexa:         Right: No mass, tenderness or fullness.          Left: No mass, tenderness or fullness.     Neurological:      Mental Status: She is alert and oriented to person, place, and time.

## 2024-02-21 PROBLEM — Z01.419 ENCOUNTER FOR GYNECOLOGICAL EXAMINATION WITHOUT ABNORMAL FINDING: Status: RESOLVED | Noted: 2019-11-06 | Resolved: 2024-02-21

## 2024-05-30 DIAGNOSIS — Z00.6 ENCOUNTER FOR EXAMINATION FOR NORMAL COMPARISON OR CONTROL IN CLINICAL RESEARCH PROGRAM: ICD-10-CM

## 2024-05-31 ENCOUNTER — APPOINTMENT (OUTPATIENT)
Dept: LAB | Facility: CLINIC | Age: 39
End: 2024-05-31

## 2024-05-31 DIAGNOSIS — Z00.6 ENCOUNTER FOR EXAMINATION FOR NORMAL COMPARISON OR CONTROL IN CLINICAL RESEARCH PROGRAM: ICD-10-CM

## 2024-05-31 PROCEDURE — 36415 COLL VENOUS BLD VENIPUNCTURE: CPT

## 2024-06-16 LAB
APOB+LDLR+PCSK9 GENE MUT ANL BLD/T: NOT DETECTED
BRCA1+BRCA2 DEL+DUP + FULL MUT ANL BLD/T: NOT DETECTED
MLH1+MSH2+MSH6+PMS2 GN DEL+DUP+FUL M: NOT DETECTED

## 2024-10-22 ENCOUNTER — OFFICE VISIT (OUTPATIENT)
Dept: DERMATOLOGY | Facility: CLINIC | Age: 39
End: 2024-10-22
Payer: COMMERCIAL

## 2024-10-22 VITALS — HEIGHT: 65 IN | TEMPERATURE: 97.8 F | BODY MASS INDEX: 35.59 KG/M2 | WEIGHT: 213.6 LBS

## 2024-10-22 DIAGNOSIS — L20.9 ATOPIC DERMATITIS, UNSPECIFIED TYPE: ICD-10-CM

## 2024-10-22 DIAGNOSIS — L74.510 HYPERHIDROSIS OF AXILLA: Primary | ICD-10-CM

## 2024-10-22 PROCEDURE — 99204 OFFICE O/P NEW MOD 45 MIN: CPT | Performed by: DERMATOLOGY

## 2024-10-22 RX ORDER — TRIAMCINOLONE ACETONIDE 1 MG/G
CREAM TOPICAL 2 TIMES DAILY
Qty: 15 G | Refills: 2 | Status: SHIPPED | OUTPATIENT
Start: 2024-10-22

## 2024-10-22 NOTE — PROGRESS NOTES
"[facility] Dermatology [facility] Note     Patient Name: Stephanie Mcguire  Encounter Date: 10/22/2024     Have you been cared for by a [facility] Dermatologist in the last 3 years and, if so, which description applies to you?    NO.   I am considered a \"new\" patient and must complete all patient intake questions. I am FEMALE/of child-bearing potential.    REVIEW OF SYSTEMS:  Have you recently had or currently have any of the following? Recent fever or chills? No  Any non-healing wound? No  Are you pregnant or planning to become pregnant? No  Are you currently or planning to be nursing or breast feeding? No   PAST MEDICAL HISTORY:  Have you personally ever had or currently have any of the following?  If \"YES,\" then please provide more detail. Skin cancer (such as Melanoma, Basal Cell Carcinoma, Squamous Cell Carcinoma?  No  Tuberculosis, HIV/AIDS, Hepatitis B or C: No  Radiation Treatment No   HISTORY OF IMMUNOSUPPRESSION:   Do you have a history of any of the following:  Systemic Immunosuppression such as Diabetes, Biologic or Immunotherapy, Chemotherapy, Organ Transplantation, Bone Marrow Transplantation or Prednsione?  No    Answering \"YES\" requires the addition of the dotphrase \"IMMUNOSUPPRESSED\" as the first diagnosis of the patient's visit.   FAMILY HISTORY:  Any \"first degree relatives\" (parent, brother, sister, or child) with the following?    Skin Cancer, Pancreatic or Other Cancer? YES, see family history. Maternal great grandfather had skin cancer   PATIENT EXPERIENCE:    Do you want the Dermatologist to perform a COMPLETE skin exam today including a clinical examination under the \"bra and underwear\" areas?  NO  If necessary, do we have your permission to call and leave a detailed message on your Preferred Phone number that includes your specific medical information?  Yes      Allergies   Allergen Reactions    Ceclor [Cefaclor] Rash     Unknown from childhood    Egg Shells - Food Allergy Rash     Rash on hands "      Current Outpatient Medications:     Diclofenac Sodium (VOLTAREN) 1 %, APPLY 2 GRAMS TO THE AFFECTED AREA(S) BY TOPICAL ROUTE 2-4 TIMES PER DAY (Patient not taking: Reported on 1/23/2024), Disp: , Rfl:     hydrocortisone 1 % cream, Apply topically 4 (four) times a day as needed for rash for up to 5 days (Patient not taking: Reported on 11/4/2022), Disp: 30 g, Rfl: 0    levothyroxine 50 mcg tablet, Take 25 mcg by mouth daily, Disp: , Rfl:     meloxicam (MOBIC) 15 mg tablet, Take 15 mg by mouth daily As directed (Patient not taking: Reported on 1/23/2024), Disp: , Rfl:     Multiple Vitamins-Minerals (MULTIVITAMIN ADULT PO), Take by mouth, Disp: , Rfl:     ondansetron (Zofran ODT) 4 mg disintegrating tablet, Take 1 tablet (4 mg total) by mouth every 6 (six) hours as needed for nausea or vomiting (Patient not taking: Reported on 1/23/2024), Disp: 20 tablet, Rfl: 0    Vitamin D-Vitamin K (VITAMIN K2-VITAMIN D3 PO), , Disp: , Rfl:           Whom besides the patient is providing clinical information about today's encounter?   NO ADDITIONAL HISTORIAN (patient alone provided history)    Physical Exam and Assessment/Plan by Diagnosis:    New patient here for hyperhidrosis. Pt was prescribed Qbrexza 2.5 cloth. Patient also has rash along underarm that popped up in June. Started after [place] trip. Pt states it will itch. No history of skin cancer. Maternal great grandfather had skin cancer. Patient was seen by [facility] dermatology previously. Had a FBSE with them this year.      HYPERHIDROSIS    Physical Exam:  Anatomic Location Affected:  axilla  Morphological Description:  currently dry  Pertinent Positives:  Pertinent Negatives:    Additional History of Present Condition:  she was prescribed Qbrexza 2.5% cloths     Assessment and Plan:  Based on a thorough discussion of this condition and the management approach to it (including a comprehensive discussion of the known risks, side effects and potential benefits of  treatment), the patient (family) agrees to implement the following specific plan:  Try cutting cloths in half to use instead of using entire cloth  Discussed topical spray form of Qbrexza cloths from compounding pharmacy     What is hyperhidrosis?  Hyperhidrosis is the name given to excessive and uncontrollable sweating.  Sweat is a weak salt solution produced by the eccrine sweat glands. These are distributed over the entire body but are most numerous on the palms and soles (with about 700 glands per square centimetre).    Who gets hyperhidrosis?  Primary hyperhidrosis is reported to affect 1-3% of the [place] population and nearly always starts during childhood or adolescence. The tendency may be inherited, and it is reported to be particularly prevalent in [place] people.  Secondary hyperhidrosis is less common and can present at any age.    What causes hyperhidrosis?  Primary hyperhidrosis appears to be due to overactivity of the hypothalamic thermoregulatory centre in the brain and is transmitted via the sympathetic nervous system to the eccrine sweat gland.    Triggers to attacks of sweating may include:  Hot weather   Exercise   Fever   Anxiety   Spicy food    Causes of secondary localised hyperhidrosis include:  Stroke   Spinal nerve damage   Peripheral nerve damage   Surgical sympathectomy   Neuropathy   A brain tumour   Chronic anxiety disorder    Causes of secondary generalised hyperhidrosis include:  Obesity   Diabetes   Menopause   Overactive thyroid   Cardiovascular disorders   Respiratory failure   Other endocrine tumours, eg pheochromocytoma   Parkinson disease   Hodgkin lymphoma   Drugs: alcohol, caffeine, corticosteroids, cholinesterase inhibitors, tricyclic antidepressants, selective serotonin reuptake inhibitors, nicotinamide and opioids    What are the clinical features of hyperhidrosis?  Hyperhidrosis can be localised or generalised.  Localised hyperhidrosis affects armpits, palms, soles, face or  other sites   Generalised hyperhidrosis affects most or all of the body    It can be primary or secondary.    Primary hyperhidrosis  Starts in childhood or adolescence   May persist lifelong or improve with age   There may be a family history   Tends to involve armpits, palms and or soles symmetrically   Usually, sweating reduces at night and disappears during sleep    Secondary hyperhidrosis  Less common than primary hyperhidrosis   More likely to be unilateral and asymmetrical, or generalised   Can occur at night or during sleep.   Due to endocrine or neurological conditions    What is the impact of excessive sweating?  Hyperhidrosis is embarrassing and interferes with many daily activities.    How is hyperhidrosis diagnosed?  Hyperhidrosis is usually diagnosed clinically. Tests relate to the potential underlying cause of hyperhidrosis and are rarely necessary for primary hyperhidrosis.  The precise site of localised hyperhidrosis can be revealed using the Minor test.  Iodine (orange) is painted onto the skin and air dried.   Starch (white) is dusted on the iodine.   Sweating is revealed by a change to dark blue/black colour.    Screening tests in secondary generalised hyperhidrosis depend on other clinical features but should include as a minimum:  Blood sugar / glycosylated haemoglobin   Thyroid function    What is the treatment of hyperhidrosis?  General measures  Wear loose-fitting, stain-resistant, sweat-proof garments.   Change clothing and footwear when damp.   Socks containing silver or copper reduce infection and odour.   Use absorbent insoles in shoes and replace them frequently.   Use a non-soap cleanser.   Apply corn starch powder after bathing.   Avoid caffeinated food and drink.   Discontinue any drug that may be causing hyperhidrosis.   Apply antiperspirant.    Topical antiperspirants  Deodorants are fragrances or antiseptics to disguise unpleasant smells; on their own, they do not reduce  "perspiration.   Antiperspirants contain 10-25% aluminium salts to reduce sweating; \"clinical strength\" aluminium zirconium salts are more effective than aluminium chloride.   Topical anticholinergics such as glycopyrrolate and oxybutynin gel have been successful in reducing sweating; cloths containing glycopyrronium tosylate (Qbrexza™) were approved by the FDA in July 2018 for axillary hyperhidrosis in adults and children 9 years of age and older. Dusting powder is available containing the anticholinergic drug, diphemanil 2%.   Antiperspirants are available as a cream, aerosol spray, stick, roll-on, wipe, powder, and paint.   Specific products are available for different body sites such as underarms, other skin folds, face, hands and feet.   They are best applied when skin is dry, after a cool shower just before sleep.   Wash off in the morning if tending to irritate.   Use from once weekly to daily if necessary.   If irritating, apply hydrocortisone cream short-term.    Iontophoresis  Iontophoresis is used for hyperhidrosis of palms, soles and armpits.   Zeke and battery-powered units are available.   The affected area is immersed in water, or, with a more significant effect, glycopyrronium solution.   A gentle electrical current is passed across the skin surface for 10-20 minutes.   Repeated daily for several weeks then less frequently as required   Iontophoresis may cause discomfort, irritation or irritant contact dermatitis.   The treatment requires a long-term commitment.   It is not always effective.    Oral medications  Oral anticholinergic drugs  Available drugs are propantheline 15-30 mg up to three times daily, oxybutynin 2.5-7.5 mg daily, benztropine, glycopyrrolate (unapproved).   They can cause dry mouth, and less often, blurred vision, constipation, dizziness, palpitations and other side effects.   People with glaucoma or urinary retention should not take them.   Caution in elderly patients: increased " risk of side effects is reported, including dementia.   Oral anticholinergics may interact with other medications.  Beta-blockers  Beta blockers block the physical effects of anxiety.   They may aggravate asthma or symptoms of peripheral vascular disease.  Calcium channel blockers, alpha adrenergic agonists (clonidine) nonsteroidal anti-inflammatory drugs and anxiolytics may also be useful for some patients.    Botulinum toxin injections  Botulinum toxin injections are approved for hyperhidrosis affecting the armpits.   The injections reduce or stop sweating for three to six months.   Botulinum toxins are used off-license for localised hyperhidrosis in other sites such as palms.   Topical botulinum toxin gel is under investigation for hyperhidrosis.    Surgical removal of axillary sweat glands  Overactive sweat glands in the armpits may be removed by several methods, usually under local anaesthetic.  Tumescent liposuction (sucking them out)   Subcutaneous curettage (scraping them out)   Microwave thermolysis (the [employer]® system approved by FDA in 2011)   Subdermal Nd:YAG laser   High-intensity micro-focused ultrasound (experimental)   Surgery to cut out the sweat gland-bearing skin of the armpits. If a large area needs to be removed, it may be repaired using a skin graft    Sympathectomy  Division of the sympathetic spinal nerves by chemical or surgical endoscopic thoracic sympathectomy (ETS) may reduce sweating of face (T2 ganglion) or armpit and hand (T3 or T4 ganglion) but is reserved for the most severely affected individuals due to potential risks and complications.  Hyperhidrosis may recur in up to 15% of cases.   Sympathectomy is often accompanied by undesirable skin warmth and dryness.   New-onset hyperhidrosis of other sites occurs in 50-90% of patients, and is severe in 2%. It is reported to be less frequent after T4 ganglion sympathectomy compared with T2 ganglion sympathectomy.   Serious complications  include Jea syndrome, pneumothorax (in up to 10%), pneumonia and persistent pain (in fewer than 2%).  Lumbar sympathectomy is not recommended for hyperhidrosis affecting the feet, as it can interfere with sexual function.    What is the outlook for hyperhidrosis?  Localised primary hyperhidrosis tends to improve with age. The outlook for secondary localised or generalised hyperhidrosis depends on the cause.    Future treatments for hyperhidrosis  Several research projects are underway to find safer and more effective treatments for hyperhidrosis. These include:  Topical anticholinergic DRM04   Combination of oxybutynin and pilocarpine (to counteract the adverse effects of the anticholinergic, oxybutynin) [facility]-102       ATOPIC DERMATITIS     Physical Exam:  Anatomic Location Affected:  left under arm  Morphological Description:  pink papules in annular configuration   Severity: mild  Pertinent Positives:  Pertinent Negatives:    Additional History of Present Condition:  rash along underarm that popped up in June. Started after [place] trip. Pt states it will itch.    Assessment and Plan:  Based on a thorough discussion of this condition and the management approach to it (including a comprehensive discussion of the known risks, side effects and potential benefits of treatment), the patient (family) agrees to implement the following specific plan:  Apply Triamcinolone 0.1% cream twice a day for a week at a time  Can also use over the counter shampoos such as head and shoulder clinical strength or Dove anti-dandruff shampoo, Selsun blue, Neutrogena T sal and T gel  May need to use a combination of shampoos, can alternate them      Assessment and Plan:   Atopic Dermatitis is a chronic, itchy skin condition that is very common in children but may occur at any age. It is also known as “eczema” or “atopic eczema.” It is the most common form of dermatitis.    Atopic dermatitis usually occurs in people who have an  "“atopic tendency.”  This means they may develop any or all of these closely linked conditions:  Atopic dermatitis, asthma, hay fever (allergic rhinitis), eosinophilic esophagitis, and gastroenteritis.  Often these conditions run within families with a parent, child or sibling also affected. A family history of asthma, eczema or hay fever is particularly useful in diagnosing atopic dermatitis in infants.    Atopic dermatitis arises because of a complex interaction of genetic and environmental factors. These include defects in skin barrier function making the skin more susceptible to irritation by soap and other contact irritants, the weather, temperature and non-specific triggers.  There is also an element of immune system dysregulation that is often present.  By definition, it is chronic and has a \"waxing-waning\" nature; flares should be expected but with good education and treatment strategies can be minimized.    Your treatment plan  Using only mild cleansers (hypoallergenic and without fragrances) and fragrance free detergent (not “unscented” products which contain a masking agent)  Apply moisturizer to entire body at least 2 times a day  Use the above listed medication to affected areas twice a day for a full 2 days after the rash has cleared  Take on over the counter antihistamine like diphenhydramine before bed if the itching is keeping you awake              ATOPIC DERMATITIS FAQS    WHAT IS ATOPIC DERMATITIS?  Atopic dermatitis (also called “eczema”) results from a weak skin barrier and an over active immune system.  The weak skin barrier allows things to get into the skin and then the immune system has an intense reaction to this substances.  The result is itchy red patches anywhere on the body.    WHO GETS ATOPIC DERMATITIS?  There is no single answer because many factors are involved.  It is likely a combination of genetic makeup and environmental triggers and/or exposures.  People with atopic dermatitis " "are prone to other types of \"atopy\".  They are at increased risk for food allergies, Asthma and hay fever and there is often a family history of these problems as well.      WHAT ABOUT FOOD ALLERGIES?  This is a very controversial topic, as many believe that food allergies are responsible for skin flares. In some cases, specific foods may cause worsening of atopic dermatitis; however this occurs in a minority of cases and usually happens within a few hours of ingestion. While food allergy is more common in children with eczema, foods are specific triggers for flares in only a small percentage of children.  If you notice that the skin flares after certain foods you can see if eliminating one food at time makes a difference, as long as your child can still enjoy a well-balanced diet.   There are blood (RAST) and skin (PRICK) tests that can check for allergies, but they are often positive in children who are not truly allergic. Therefore it is important that you work with your allergist and dermatologist to determine which foods are relevant and causing true symptoms.  Extreme food elimination diets without the guidance of your doctor, which have become more popular in recent years, may even result in worsening of the skin rash due to malnutrition and avoidance of essential nutrients.      WHY SO MUCH MOISTURIZER?  This is the 1st step and most important part of treatment.  This helps maintaint the skin's barrier function and prevent flares. Creams and ointments are preferable over lotions.  Aveeno eczema relief and Eucerin eczema relief  and cerave are all good ones to start with. It is best to put  moisturizer on directly after bathing, while the skin is damp, it is twice as effective then.  You may bathe daily.  Use warm - not hot - water, for short periods of time (5-10 minutes at a time) Dry off by Lightly patting the skin with a towel and not rubbing. While the skin is still damp, (within 3 minutes) apply any " medications to the rashy areas 1st and then moisturize the entire body. It's best to apply the medication 1st but if the medications sting, moisturizer can be applied 1st.    WHY USE THE MEDICATION FOR AN EXTRA 2 DAYS AFTER THE RASH IS GONE?  Sometimes the rash may appear to be gone, but there are still microscopic changes in the skin.  Using the medication and extra 2 days will ensure the inflammation has resolve and make the rash less likely to return quickly.        WHAT ARE TRIGGERS?  Occasionally there are specific things that trigger itching and rashes, but in many cases may none can be identified.  The most common triggers are:  Heat and sweat for some individuals, cold weather for others.  House dust mites, pet fur.  Wool; synthetic fabrics like nylon; dyed fabrics.  Tobacco smoke   Fragrances in: shampoos, soaps, lotions, laundry detergents, fabric softeners.  Saliva or prolonged exposure to water.   start with these.  Avoid the use of fabric softeners in the washing machine or dryer sheets (unless they are fragrance-free).  Try to use laundry detergents, soaps and shampoos that are fragrance-free. You may find it helpful to double-rinse your clothes.  Some children are sensitive to house dust mites and they may benefit from a plastic mattress wrap.  While food allergy is more common in children with eczema, foods are specific triggers for flares in only a small percentage of children.  If you notice that the skin flares after certain foods you can see if eliminating one food at time makes a difference, as long as your child can still enjoy a well-balanced diet.     4) WHAT DOES THE ANTIHISTAMINE DO?   Antihistamines help you not to scratch. Scratching only makes the skin more reactive and the barrier function even more disrupted.  It can cause both children and their parents to lose sleep!  There are different types of anti-itch medications.  Some cause more drowsiness than others.  Both types are  "acceptable depending on your child and your preference.  Start with Benadryl and if that does not work, ask for a prescription “antihistamine.”    5) ARE THERE ANY SIDE EFFECTS TO WORRY ABOUT?  Corticosteroid creams and ointments (generally things with \"-one\" or \"tiffanie\" on the end of their names):  The strength of the cream or ointment depends on the name of the active ingredient.  The numbers at the end do not indicate the relative strength.  Thus triamcinolone 0.1% ointment, considered a mid-strength corticosteroid, is much stronger than hydrocortisone 1% even though the number following the name is much lower.  Topical corticosteroids are very effective in treating atopic dermatitis.  When used in the manner prescribed (to rashy areas of skin and for no more than a few weeks at a time to any one area) they are very safe.  These are corticosteroids and are anti-inflammatory, not the “anabolic steroids” like those used illegally by some athletes. It is important that you do not overuse steroid creams, and if you notice a thin, shiny appearance to the skin or broken blood vessels, you should stop using the cream and consult your health care provider regarding possible overuse/overthinning of the skin.  The face, armpits and groin have particularly thin and sensitive skin and are therefore most at risk for bad results if steroids are over-used in these sites.      Topical non-steroid creams and ointments (immunomodulators):  These creams and ointments are also called topical calcineurin inhibitors (TCIs).  These include Protopic ointment and Elidel cream. Crisaborole 2% (Eucrisa) is a prescription ointment that targets an enzyme called PDE4 (phosphodiesterase 4).  It is used on the skin topically to treat mild-to-moderate eczema in adults and children 2 years of age and older.  In total, these nonsteroidal prescriptions are used to help decrease itching and redness in the skin.  They are not as strong as most steroid " creams; however, it is believed that they do not thin the skin when overused.  They are generally used as second-line medications, though they may be used alone or in conjunction with topical steroids.  In sensitive areas such as the face, underarms or groin, they are often recommended.  They can sting inflamed skin, but are generally well tolerated once the skin is healing.    The FDA placed a “black-box” warning on both Elidel and Protopic in 2006 based on animal studies using the medications.  Some animals developed skin cancer and lymphoma.  Subsequently, the FDA released a statement that there is no causal relationship between the two medications and cancer.  Because of this concern, there are ongoing studies to evaluate this relationship in humans.  So far, there are studies that support the safety of these medications.  One showed that the rates of cancer in patient using these medications topically were less than the rates of the general population and another showed that in patient's using the medication over a large area of the body, the levels of the medication in the blood was undetectable.    As for Eucrisa, this product is only approved for the topical treatment of mild-to-moderate eczema in patients 2 years of age and older; use of the medication in kids younger than 2 is considered “off label” and has not been formally studied.  Burning and stinging are the most commonly reported side effects of this medication.  Rarely, this product has been known to cause hives and hypersensitivity reactions; discontinue its use if you develop severe itching, swelling, or redness in the area of application.     Scribe Attestation      I,:  Shira Gan MA am acting as a scribe while in the presence of the attending physician.:       I,:  Joie Silvestre MD personally performed the services described in this documentation    as scribed in my presence.:

## 2024-11-19 ENCOUNTER — TELEPHONE (OUTPATIENT)
Age: 39
End: 2024-11-19

## 2024-11-19 DIAGNOSIS — L74.510 HYPERHIDROSIS OF AXILLA: Primary | ICD-10-CM

## 2024-11-19 RX ORDER — GLYCOPYRRONIUM 2.4 G/100G
1 CLOTH TOPICAL
Qty: 30 EACH | Refills: 11 | Status: SHIPPED | OUTPATIENT
Start: 2024-11-19

## 2024-11-19 NOTE — TELEPHONE ENCOUNTER
Pharmacy calling in to relay rx needs pa. Confirmed we are aware and will work on submitting. No other questions at this time.

## 2024-11-19 NOTE — TELEPHONE ENCOUNTER
Pt calling in stating she requires new prior authorization for qbrexza 2.5% cloths she was previously prescribed and is using remaining refills on. Routing for review.

## 2024-11-19 NOTE — LETTER
ATTN: [facility] Appeals Department     Patient: Stephanie Mcguire :1985 Member ID:[id number]    Re: Request for Reconsideration of Qbrexza 2.4% pads      To Whom It May Concern:   Stephanie Mcguire 1985 was denied coverage for the medication Qbrexza 2.4% pads on 2024. The denial reason was stated as not covered due to not meeting insurance criteria. I am requesting a redetermination of the denial of coverage due to patient having a HDSS Score of 1% which is documented in the clinical note that I have attached to this appeal request.      In conclusion, please reconsider the denial of Qbrexza 2.4% pads  for my patient. I would appreciate prompt review of this appeal and approval of this FDA-approved medication. I can be reached by phone at [phone] or via fax at [phone] for additional information and discussion. Thank you.      Sincerely,   Joie Silvestre MD

## 2024-11-21 NOTE — TELEPHONE ENCOUNTER
PA for Qbrexza 2.4% pads SUBMITTED to [facility]     via    [x]CMM-KEY: [id number]- unable to submit x2  []Surescripts-Case ID #   [x]Availity-  Auth ID # -[id number]  NDC # -[id number]  []Faxed to plan   []Other website   []Phone call Case ID #     [x]PA sent as URGENT    All office notes, labs and other pertaining documents and studies sent. Clinical questions answered. Awaiting determination from insurance company.     Turnaround time for your insurance to make a decision on your Prior Authorization can take 7-21 business days.

## 2024-11-21 NOTE — TELEPHONE ENCOUNTER
Call received from [facility] to let us know that qbrexza pads   needs PA informed that the PA was started and we are waiting for a response from insurance .

## 2024-11-25 NOTE — TELEPHONE ENCOUNTER
PA for Qbrexza 2.4% pads  DENIED    Reason:(Screenshot if applicable)        Message sent to office clinical pool Yes    Denial letter scanned into Media Yes    Appeal started No (Provider will need to decide if appeal is warranted and send clinical documentation to Prior Authorization Team for initiation.)    **Please follow up with your patient regarding denial and next steps**

## 2024-11-25 NOTE — TELEPHONE ENCOUNTER
Call received from [facility] requesting to start PA for Glycopyrronium Tosylate (Qbrexza) 2.4 % PADS .   Pa submitted on 11/21 , awaiting response.

## 2024-11-27 NOTE — TELEPHONE ENCOUNTER
Patient called about the PA for qbrexza advised of message Dr Silvestre had [facility] to have the score submitted for the refill PA to be done.

## 2024-11-29 NOTE — TELEPHONE ENCOUNTER
PA for Qbrexza 2.4% pads APPEALED via     []CMM  []SS  [x]Letter sent to insurance via fax [phone]  []Other site or means     All necessary records sent. Will await response from insurance company    Turnaround time for a decision to be made on an appeal could take up to 30 business days

## 2025-01-28 ENCOUNTER — ANNUAL EXAM (OUTPATIENT)
Dept: OBGYN CLINIC | Facility: CLINIC | Age: 40
End: 2025-01-28
Payer: COMMERCIAL

## 2025-01-28 VITALS
HEIGHT: 65 IN | WEIGHT: 215.4 LBS | DIASTOLIC BLOOD PRESSURE: 68 MMHG | SYSTOLIC BLOOD PRESSURE: 102 MMHG | BODY MASS INDEX: 35.89 KG/M2

## 2025-01-28 DIAGNOSIS — Z12.4 PAP SMEAR FOR CERVICAL CANCER SCREENING: ICD-10-CM

## 2025-01-28 DIAGNOSIS — Z01.419 ENCOUNTER FOR ANNUAL ROUTINE GYNECOLOGICAL EXAMINATION: Primary | ICD-10-CM

## 2025-01-28 PROCEDURE — S0612 ANNUAL GYNECOLOGICAL EXAMINA: HCPCS | Performed by: OBSTETRICS & GYNECOLOGY

## 2025-01-28 PROCEDURE — G0145 SCR C/V CYTO,THINLAYER,RESCR: HCPCS | Performed by: OBSTETRICS & GYNECOLOGY

## 2025-01-28 PROCEDURE — G0476 HPV COMBO ASSAY CA SCREEN: HCPCS | Performed by: OBSTETRICS & GYNECOLOGY

## 2025-01-28 NOTE — PROGRESS NOTES
"Name: Stephanie Mcguire      : 1985      MRN: [id number]  Encounter Provider: Ashanti Marquez MD  Encounter Date: 2025   Encounter department: [facility]  :  Assessment & Plan  Encounter for annual routine gynecological examination  Pap/HPV collected      Encourage healthy diet, exercise, Calcium 1200mg per day and at least 800 iu Vitamin D daily.             Pap smear for cervical cancer screening    Orders:    Liquid-based pap, screening        History of Present Illness   Menses regular.   Some urinary frequency. incontinence    Gynecologic Exam  She reports no genital itching, genital lesions, genital odor, genital rash, pelvic pain, vaginal bleeding or vaginal discharge. The patient is experiencing no pain. Pertinent negatives include no chills, constipation, diarrhea, fever, nausea, sore throat or vomiting.     Stephanie Mcguire is a 40 y.o. female who presents for a routine annual visit    Menarche- 11Y/O  Last Pap Smear- 20 neg/neg--collect today  LMP-24  Birth control- had vasectomy    Non smoker  Social drinker  Currently sexually active  MGM--breast cancer    No concerns/questions for today's visit          Review of Systems   Constitutional:  Negative for activity change, appetite change, chills, fatigue and fever.   HENT:  Negative for rhinorrhea, sneezing and sore throat.    Eyes:  Negative for visual disturbance.   Respiratory:  Negative for cough, shortness of breath and wheezing.    Cardiovascular:  Negative for chest pain, palpitations and leg swelling.   Gastrointestinal:  Negative for abdominal distention, constipation, diarrhea, nausea and vomiting.   Genitourinary:  Negative for difficulty urinating, pelvic pain and vaginal discharge.   Neurological:  Negative for syncope and light-headedness.          Objective   /68 (BP Location: Left arm, Patient Position: Sitting, Cuff Size: Standard)   Ht 5' 4.5\" " (1.638 m)   Wt 97.7 kg (215 lb 6.4 oz)   LMP 12/30/2024 (Exact Date)   BMI 36.40 kg/m²      Physical Exam  Chest:   Breasts:     Breasts are symmetrical.      Right: No inverted nipple, mass, nipple discharge, skin change or tenderness.      Left: No inverted nipple, mass, nipple discharge, skin change or tenderness.   Genitourinary:     Labia:         Right: No rash, tenderness, lesion or injury.         Left: No rash, tenderness, lesion or injury.       Vagina: Normal. No vaginal discharge, erythema, tenderness or bleeding.      Cervix: No cervical motion tenderness, discharge, friability, erythema or cervical bleeding.      Uterus: Not deviated, not enlarged, not fixed and not tender.       Adnexa:         Right: No mass, tenderness or fullness.          Left: No mass, tenderness or fullness.     Neurological:      Mental Status: She is alert and oriented to person, place, and time.

## 2025-01-31 ENCOUNTER — RESULTS FOLLOW-UP (OUTPATIENT)
Dept: OBGYN CLINIC | Facility: CLINIC | Age: 40
End: 2025-01-31

## 2025-01-31 LAB
LAB AP GYN PRIMARY INTERPRETATION: NORMAL
Lab: NORMAL

## 2025-02-07 NOTE — TELEPHONE ENCOUNTER
VM left for patient (per communication consent) to advise that her pap and hpv results are normal.

## 2025-02-07 NOTE — TELEPHONE ENCOUNTER
----- Message from Ashanti Marquez MD sent at 2/7/2025 11:04 AM EST -----  Patient has not viewed [employer] message.  Please call with my message

## 2025-06-17 NOTE — PROGRESS NOTES
Assessment/Plan:    History of lobectomy of thyroid  Advised patient symptoms may be related to hypothyroidism  Perimenopause may contribute to mood swings, fatigue, and hot flashes but with regular periods, it is unlikely to be caused by estrogen dysfunction  Will consider further hormone testing if thyroid studies are within normal range  May need to see endocrine  Diagnoses and all orders for this visit:    History of lobectomy of thyroid  -     TSH, 3rd generation; Future  -     T4, free; Future  -     T3, free; Future    Alopecia    Weight gain    Heat intolerance    Cold intolerance          Subjective:      Patient ID: Itzel Chapman is a 40 y o  female here for multiple concerns  She reports weight gain of approximately 30 lbs over the summer, heat and cold intolerance, hair loss, brittle nails, and mood swings  She reports a history of a thyroid nodule which she had removed in January  Her last TSH was drawn in March and was normal  She is not currently on any levothyroxine  She reports a family history of Hashimoto's and thyroid cancer  Her menstrual cycles are regular every 28-35 days  Periods typically last 4-5 days and are light- moderate  She denies dysmenorrhea  She reports no major changes to her diet or exercise routine  She states she has been limited in her exercises since a foot injury in February but goes for walks daily  She is a non smoker and no alcohol use  She was seen by a dermatologist for a heat rash on her neck but denies any other rashes  She denies constipation or abdominal pain but does report occasional diarrhea in the mornings  She denies hirsutism, acne, pelvic pain, dyspareunia, or urinary symptoms  The following portions of the patient's history were reviewed and updated as appropriate:   She  has a past medical history of Concussion (2006), Fracture, foot (1998), Palpitation, Varicella, and Visual impairment    She   Patient Active Problem List Diagnosis Date Noted   • History of lobectomy of thyroid 11/04/2022   • Substernal goiter 12/13/2021   • Thyroid mass 11/16/2021   • Ankle impingement syndrome, right 02/26/2021   • Encounter for gynecological examination without abnormal finding 11/06/2019     She  has a past surgical history that includes TONSILECTOMY AND ADNOIDECTOMY; Myringotomy w/ tubes; [place] tooth extraction; Lung surgery; Other surgical history; and US guided thyroid biopsy (10/8/2020)  Her family history includes Asthma in her other and other; Breast cancer in her maternal grandmother; Crohn's disease in her mother; Depression in her other, other, and sister; Diabetes in her father, maternal grandmother, and paternal grandfather; Endometriosis in her family; Gout in her father; Hashimoto's thyroiditis in her mother; Heart disease in her maternal grandfather and other; Hypertension in her father; No Known Problems in her daughter; Other in her maternal grandmother, mother, other, and other; Parkinsonism in her mother; Seizures in her other and sister; Thyroid cancer in her mother  She  reports that she has never smoked  She has never used smokeless tobacco  She reports that she does not drink alcohol and does not use drugs  Current Outpatient Medications   Medication Sig Dispense Refill   • hydrocortisone 1 % cream Apply topically 4 (four) times a day as needed for rash for up to 5 days (Patient not taking: Reported on 11/4/2022) 30 g 0   • Multiple Vitamins-Minerals (MULTIVITAMIN ADULT PO) Take by mouth (Patient not taking: Reported on 11/4/2022)     • Vitamin D-Vitamin K (VITAMIN K2-VITAMIN D3 PO)  (Patient not taking: No sig reported)       No current facility-administered medications for this visit  She is allergic to ceclor [cefaclor] and egg shells - food allergy       Review of Systems   Constitutional: Positive for fatigue and unexpected weight change  Negative for chills and fever  HENT: Negative for congestion      Eyes: Negative for visual disturbance  Respiratory: Negative for shortness of breath  Cardiovascular: Negative for chest pain and leg swelling  Gastrointestinal: Positive for diarrhea  Negative for abdominal pain, constipation, nausea and vomiting  Endocrine: Positive for cold intolerance and heat intolerance  Genitourinary: Negative for dyspareunia, dysuria, menstrual problem, pelvic pain, vaginal bleeding, vaginal discharge and vaginal pain  Musculoskeletal: Negative for back pain, myalgias and neck pain  Skin: Negative for pallor and rash  Neurological: Negative for dizziness, light-headedness and headaches  Hematological: Negative for adenopathy  Psychiatric/Behavioral: Positive for dysphoric mood  Objective:      /72 (BP Location: Right arm, Patient Position: Sitting, Cuff Size: Large)   Ht 5' 5" (1 651 m)   Wt 104 kg (229 lb)   LMP 10/28/2022 (LMP Unknown)   BMI 38 11 kg/m²          Physical Exam  Vitals and nursing note reviewed  Constitutional:       General: She is not in acute distress  Appearance: Normal appearance  She is not ill-appearing  HENT:      Head: Normocephalic and atraumatic  Eyes:      Conjunctiva/sclera: Conjunctivae normal    Pulmonary:      Effort: Pulmonary effort is normal    Abdominal:      Palpations: Abdomen is soft  Tenderness: There is no abdominal tenderness  Musculoskeletal:         General: Normal range of motion  Cervical back: Neck supple  Skin:     General: Skin is warm and dry  Neurological:      General: No focal deficit present  Mental Status: She is alert     Psychiatric:         Mood and Affect: Mood normal          Behavior: Behavior normal  Universal Safety Interventions